# Patient Record
Sex: FEMALE | Race: WHITE | URBAN - METROPOLITAN AREA
[De-identification: names, ages, dates, MRNs, and addresses within clinical notes are randomized per-mention and may not be internally consistent; named-entity substitution may affect disease eponyms.]

---

## 2017-01-03 ENCOUNTER — INPATIENT (INPATIENT)
Facility: HOSPITAL | Age: 77
LOS: 2 days | Discharge: NURSING, REHAB OR LONG-TERM CARE | DRG: 291 | End: 2017-01-06
Attending: INTERNAL MEDICINE | Admitting: INTERNAL MEDICINE
Payer: MEDICARE

## 2017-01-03 VITALS
RESPIRATION RATE: 16 BRPM | HEART RATE: 73 BPM | SYSTOLIC BLOOD PRESSURE: 120 MMHG | DIASTOLIC BLOOD PRESSURE: 56 MMHG | OXYGEN SATURATION: 100 %

## 2017-01-03 DIAGNOSIS — J44.1 CHRONIC OBSTRUCTIVE PULMONARY DISEASE WITH (ACUTE) EXACERBATION: ICD-10-CM

## 2017-01-03 DIAGNOSIS — I25.10 ATHEROSCLEROTIC HEART DISEASE OF NATIVE CORONARY ARTERY WITHOUT ANGINA PECTORIS: ICD-10-CM

## 2017-01-03 DIAGNOSIS — E11.8 TYPE 2 DIABETES MELLITUS WITH UNSPECIFIED COMPLICATIONS: ICD-10-CM

## 2017-01-03 DIAGNOSIS — E03.9 HYPOTHYROIDISM, UNSPECIFIED: ICD-10-CM

## 2017-01-03 DIAGNOSIS — N28.9 DISORDER OF KIDNEY AND URETER, UNSPECIFIED: ICD-10-CM

## 2017-01-03 DIAGNOSIS — I48.91 UNSPECIFIED ATRIAL FIBRILLATION: ICD-10-CM

## 2017-01-03 DIAGNOSIS — Z98.49 CATARACT EXTRACTION STATUS, UNSPECIFIED EYE: Chronic | ICD-10-CM

## 2017-01-03 DIAGNOSIS — Z41.8 ENCOUNTER FOR OTHER PROCEDURES FOR PURPOSES OTHER THAN REMEDYING HEALTH STATE: ICD-10-CM

## 2017-01-03 DIAGNOSIS — I50.9 HEART FAILURE, UNSPECIFIED: ICD-10-CM

## 2017-01-03 PROCEDURE — 99223 1ST HOSP IP/OBS HIGH 75: CPT

## 2017-01-03 PROCEDURE — 93010 ELECTROCARDIOGRAM REPORT: CPT

## 2017-01-03 PROCEDURE — 71010: CPT | Mod: 26

## 2017-01-03 PROCEDURE — 99285 EMERGENCY DEPT VISIT HI MDM: CPT

## 2017-01-03 RX ORDER — FUROSEMIDE 40 MG
20 TABLET ORAL
Qty: 0 | Refills: 0 | Status: COMPLETED | OUTPATIENT
Start: 2017-01-03 | End: 2017-01-05

## 2017-01-03 RX ORDER — INSULIN GLARGINE 100 [IU]/ML
30 INJECTION, SOLUTION SUBCUTANEOUS AT BEDTIME
Qty: 0 | Refills: 0 | Status: DISCONTINUED | OUTPATIENT
Start: 2017-01-03 | End: 2017-01-06

## 2017-01-03 RX ORDER — ZOLPIDEM TARTRATE 10 MG/1
5 TABLET ORAL AT BEDTIME
Qty: 0 | Refills: 0 | Status: DISCONTINUED | OUTPATIENT
Start: 2017-01-03 | End: 2017-01-06

## 2017-01-03 RX ORDER — DEXTROSE 50 % IN WATER 50 %
25 SYRINGE (ML) INTRAVENOUS ONCE
Qty: 0 | Refills: 0 | Status: DISCONTINUED | OUTPATIENT
Start: 2017-01-03 | End: 2017-01-06

## 2017-01-03 RX ORDER — CLOPIDOGREL BISULFATE 75 MG/1
75 TABLET, FILM COATED ORAL DAILY
Qty: 0 | Refills: 0 | Status: DISCONTINUED | OUTPATIENT
Start: 2017-01-03 | End: 2017-01-06

## 2017-01-03 RX ORDER — LEVOTHYROXINE SODIUM 125 MCG
75 TABLET ORAL DAILY
Qty: 0 | Refills: 0 | Status: DISCONTINUED | OUTPATIENT
Start: 2017-01-03 | End: 2017-01-06

## 2017-01-03 RX ORDER — FLUTICASONE PROPIONATE AND SALMETEROL 50; 250 UG/1; UG/1
1 POWDER ORAL; RESPIRATORY (INHALATION)
Qty: 0 | Refills: 0 | Status: DISCONTINUED | OUTPATIENT
Start: 2017-01-03 | End: 2017-01-06

## 2017-01-03 RX ORDER — DEXTROSE 50 % IN WATER 50 %
12.5 SYRINGE (ML) INTRAVENOUS ONCE
Qty: 0 | Refills: 0 | Status: DISCONTINUED | OUTPATIENT
Start: 2017-01-03 | End: 2017-01-06

## 2017-01-03 RX ORDER — INSULIN LISPRO 100/ML
VIAL (ML) SUBCUTANEOUS
Qty: 0 | Refills: 0 | Status: DISCONTINUED | OUTPATIENT
Start: 2017-01-03 | End: 2017-01-06

## 2017-01-03 RX ORDER — ACETAMINOPHEN 500 MG
650 TABLET ORAL EVERY 6 HOURS
Qty: 0 | Refills: 0 | Status: DISCONTINUED | OUTPATIENT
Start: 2017-01-03 | End: 2017-01-06

## 2017-01-03 RX ORDER — GLUCAGON INJECTION, SOLUTION 0.5 MG/.1ML
1 INJECTION, SOLUTION SUBCUTANEOUS ONCE
Qty: 0 | Refills: 0 | Status: DISCONTINUED | OUTPATIENT
Start: 2017-01-03 | End: 2017-01-06

## 2017-01-03 RX ORDER — SIMVASTATIN 20 MG/1
20 TABLET, FILM COATED ORAL AT BEDTIME
Qty: 0 | Refills: 0 | Status: DISCONTINUED | OUTPATIENT
Start: 2017-01-03 | End: 2017-01-06

## 2017-01-03 RX ORDER — SODIUM CHLORIDE 9 MG/ML
1000 INJECTION, SOLUTION INTRAVENOUS
Qty: 0 | Refills: 0 | Status: DISCONTINUED | OUTPATIENT
Start: 2017-01-03 | End: 2017-01-06

## 2017-01-03 RX ORDER — INSULIN GLARGINE 100 [IU]/ML
40 INJECTION, SOLUTION SUBCUTANEOUS EVERY MORNING
Qty: 0 | Refills: 0 | Status: DISCONTINUED | OUTPATIENT
Start: 2017-01-03 | End: 2017-01-06

## 2017-01-03 RX ORDER — ALBUTEROL 90 UG/1
2.5 AEROSOL, METERED ORAL EVERY 4 HOURS
Qty: 0 | Refills: 0 | Status: DISCONTINUED | OUTPATIENT
Start: 2017-01-03 | End: 2017-01-06

## 2017-01-03 RX ORDER — WARFARIN SODIUM 2.5 MG/1
6 TABLET ORAL ONCE
Qty: 0 | Refills: 0 | Status: COMPLETED | OUTPATIENT
Start: 2017-01-03 | End: 2017-01-03

## 2017-01-03 RX ORDER — ASPIRIN/CALCIUM CARB/MAGNESIUM 324 MG
81 TABLET ORAL DAILY
Qty: 0 | Refills: 0 | Status: DISCONTINUED | OUTPATIENT
Start: 2017-01-03 | End: 2017-01-04

## 2017-01-03 RX ORDER — ATENOLOL 25 MG/1
25 TABLET ORAL
Qty: 0 | Refills: 0 | Status: DISCONTINUED | OUTPATIENT
Start: 2017-01-03 | End: 2017-01-06

## 2017-01-03 RX ORDER — DEXTROSE 50 % IN WATER 50 %
1 SYRINGE (ML) INTRAVENOUS ONCE
Qty: 0 | Refills: 0 | Status: DISCONTINUED | OUTPATIENT
Start: 2017-01-03 | End: 2017-01-06

## 2017-01-03 RX ORDER — INSULIN LISPRO 100/ML
VIAL (ML) SUBCUTANEOUS AT BEDTIME
Qty: 0 | Refills: 0 | Status: DISCONTINUED | OUTPATIENT
Start: 2017-01-03 | End: 2017-01-06

## 2017-01-03 RX ORDER — INSULIN GLARGINE 100 [IU]/ML
40 INJECTION, SOLUTION SUBCUTANEOUS AT BEDTIME
Qty: 0 | Refills: 0 | Status: DISCONTINUED | OUTPATIENT
Start: 2017-01-03 | End: 2017-01-03

## 2017-01-03 RX ORDER — IPRATROPIUM/ALBUTEROL SULFATE 18-103MCG
3 AEROSOL WITH ADAPTER (GRAM) INHALATION EVERY 6 HOURS
Qty: 0 | Refills: 0 | Status: DISCONTINUED | OUTPATIENT
Start: 2017-01-03 | End: 2017-01-06

## 2017-01-03 RX ADMIN — Medication 40 MILLIGRAM(S): at 18:53

## 2017-01-03 RX ADMIN — ZOLPIDEM TARTRATE 5 MILLIGRAM(S): 10 TABLET ORAL at 23:27

## 2017-01-03 RX ADMIN — Medication 3 MILLILITER(S): at 23:33

## 2017-01-03 RX ADMIN — INSULIN GLARGINE 30 UNIT(S): 100 INJECTION, SOLUTION SUBCUTANEOUS at 22:42

## 2017-01-03 RX ADMIN — Medication 3 MILLILITER(S): at 19:54

## 2017-01-03 RX ADMIN — Medication: at 17:45

## 2017-01-03 RX ADMIN — ZOLPIDEM TARTRATE 5 MILLIGRAM(S): 10 TABLET ORAL at 22:24

## 2017-01-03 RX ADMIN — WARFARIN SODIUM 6 MILLIGRAM(S): 2.5 TABLET ORAL at 22:24

## 2017-01-03 RX ADMIN — ATENOLOL 25 MILLIGRAM(S): 25 TABLET ORAL at 19:01

## 2017-01-03 RX ADMIN — FLUTICASONE PROPIONATE AND SALMETEROL 1 DOSE(S): 50; 250 POWDER ORAL; RESPIRATORY (INHALATION) at 18:54

## 2017-01-03 RX ADMIN — Medication 20 MILLIGRAM(S): at 18:53

## 2017-01-03 NOTE — H&P ADULT. - PROBLEM SELECTOR PLAN 1
Admitted to telemetry.  Will place patient on low-dose IV Lasix and PO Zaroxlyn in view of her renal insufficiency.  We'll continue patient on her beta blockers.  Monitor intake and output.  Monitor daily weights.  Monitor BMP.  Will check 2-D echo to assess LV function.  Cardiology consultation requested.  Further management per patient's clinical course.

## 2017-01-03 NOTE — INPATIENT CERTIFICATION FOR MEDICARE PATIENTS - RISKS OF ADVERSE EVENTS
Concern for delay in diagnosis and treatment/Concern for renal deterioration/Concern for cardiopulmonary deterioration

## 2017-01-03 NOTE — H&P ADULT. - RESPIRATORY COMMENTS
Bilateral decreased breath sounds noted at the bases.  Occasional rales noted at the bases.  Occasional wheezing heard scattered throughout the lungs.  No rhonchi are heard.

## 2017-01-03 NOTE — H&P ADULT. - PSH
hysterectomy due to menometorrhagia/Bleeding      right  hip replacement due to Arthropathy    b/l  eye surgery for ? Ocular Hypertension     Delivery ,  &     in 2006  left  hip replacement due to Arthropathy    S/P Cholecystectomy    S/P Tonsillectomy    Status post cataract extraction

## 2017-01-03 NOTE — H&P ADULT. - PROBLEM SELECTOR PLAN 2
Patient with possible mild COPD exacerbation.  We'll place on low-dose IV steroids and DuoNeb nebs.  Continue patient on Advair and Spiriva.  Continue O2 supplementation.  Pulmonary critical care consultation requested.  Further management per patient's clinical course

## 2017-01-03 NOTE — H&P ADULT. - PROBLEM SELECTOR PLAN 5
Will continue patient on Lantus twice a day as per her outpatient regimen.  We'll place patient on female sliding scale coverage.  Hypoglycemia protocol.  Check A1c in a.m.

## 2017-01-03 NOTE — H&P ADULT. - PROBLEM SELECTOR PLAN 4
Will continue patient on aspirin and Plavix due to her history of stent.  Continue patient on atenolol with holding parameters.  We will obtain serial CPKs and troponins.  Cardiology follow-up requested.

## 2017-01-03 NOTE — H&P ADULT. - HISTORY OF PRESENT ILLNESS
76 years old female with history of CAD, S/P Stent, COPD, home O2 dependent, HTN, DM, CHF (diastolic), morbid obesity, insomnia, and hypothyroidism, who came in to ER with c/o progressively increasing shortness of breath on ambulation and decreasing exercise capacity. Patient states the symptoms started few days ago. 76-year-old female with past medical history of CAD, stent placement, hypertension, diabetes mellitus, chronic diastolic heart failure, COPD, home O2 dependent, morbid obesity, insomnia and hypothyroidism, who was sent into the emergency room secondary to progressively worsening shortness of breath and decreasing exercise capacity.  Patient states she has been having symptoms for last few days.  She also noticed increased abdominal girth and weight.  She denies any associated chest pain or chest pressure.  Denies any nausea or vomiting.  Denies any fevers or chills.    Patient admits to having orthopnea and has not been able to lie down in the bed to sleep.  She denies any paroxysmal nocturnal dyspnea.  She denies any dizziness or syncope.  She denies any previous such episodes.  Patient is being admitted to the hospital for further evaluation and management.

## 2017-01-03 NOTE — H&P ADULT. - PMH
2006  left hip Arthropathy    Atrial Fibrillation    COPD (Chronic Obstructive Pulmonary Disease)    Diabetes x 15 years----on insulin 5 years    h/o  due menometorrhagia/Bleeding    Hypothyroid after she received radioactive iodine for hyperthyroidism    in 2004, right  hip Arthropathy and in 2006 right hip replacement    in 2011    left knee Arthropathy    Obesity    right knee Arthropathy    Sleep Apnea--uses device at night

## 2017-01-03 NOTE — H&P ADULT. - PROBLEM SELECTOR PLAN 3
Will continue patient on atenolol for rate control.  Patient is therapeutically anticoagulated on Coumadin.  Monitor daily INR and dose Coumadin according to INR.

## 2017-01-03 NOTE — H&P ADULT. - RS GEN PE MLT RESP DETAILS PC
no intercostal retractions/no chest wall tenderness/diminished breath sounds, L/no rhonchi/no subcutaneous emphysema/diminished breath sounds, R/rales/airway patent

## 2017-01-03 NOTE — H&P ADULT. - ASSESSMENT
76-year-old female with history of CAD, stent placement, hypertension, PAF, diabetes mellitus, chronic diastolic heart failure, COPD, home O2 dependent, morbid obesity, insomnia and hypothyroidism, who now presents with increasing shortness of breath.  Patient most likely has acute on chronic diastolic heart failure.  She also may have an element of COPD exacerbation.  Patient will be admitted to the hospital for further evaluation and management.

## 2017-01-04 LAB
ANION GAP SERPL CALC-SCNC: 9 MMOL/L — SIGNIFICANT CHANGE UP (ref 5–17)
BASOPHILS # BLD AUTO: 0.1 K/UL — SIGNIFICANT CHANGE UP (ref 0–0.2)
BASOPHILS NFR BLD AUTO: 0.7 % — SIGNIFICANT CHANGE UP (ref 0–2)
BUN SERPL-MCNC: 39 MG/DL — HIGH (ref 7–23)
CALCIUM SERPL-MCNC: 9.1 MG/DL — SIGNIFICANT CHANGE UP (ref 8.4–10.5)
CHLORIDE SERPL-SCNC: 101 MMOL/L — SIGNIFICANT CHANGE UP (ref 96–108)
CHOLEST SERPL-MCNC: 116 MG/DL — SIGNIFICANT CHANGE UP (ref 10–199)
CK SERPL-CCNC: 119 U/L — SIGNIFICANT CHANGE UP (ref 26–192)
CK SERPL-CCNC: 131 U/L — SIGNIFICANT CHANGE UP (ref 26–192)
CO2 SERPL-SCNC: 32 MMOL/L — HIGH (ref 22–31)
CREAT SERPL-MCNC: 1.91 MG/DL — HIGH (ref 0.5–1.3)
EOSINOPHIL # BLD AUTO: 0 K/UL — SIGNIFICANT CHANGE UP (ref 0–0.5)
EOSINOPHIL NFR BLD AUTO: 0 % — SIGNIFICANT CHANGE UP (ref 0–6)
GLUCOSE SERPL-MCNC: 380 MG/DL — HIGH (ref 70–99)
HBA1C BLD-MCNC: 8 % — HIGH (ref 4–5.6)
HCT VFR BLD CALC: 38.3 % — SIGNIFICANT CHANGE UP (ref 34.5–45)
HDLC SERPL-MCNC: 64 MG/DL — SIGNIFICANT CHANGE UP (ref 40–125)
HGB BLD-MCNC: 12.1 G/DL — SIGNIFICANT CHANGE UP (ref 11.5–15.5)
INR BLD: 2.23 RATIO — HIGH (ref 0.88–1.16)
LIPID PNL WITH DIRECT LDL SERPL: 39 MG/DL — SIGNIFICANT CHANGE UP
LYMPHOCYTES # BLD AUTO: 0.6 K/UL — LOW (ref 1–3.3)
LYMPHOCYTES # BLD AUTO: 7.5 % — LOW (ref 13–44)
MAGNESIUM SERPL-MCNC: 2 MG/DL — SIGNIFICANT CHANGE UP (ref 1.6–2.6)
MCHC RBC-ENTMCNC: 30.5 PG — SIGNIFICANT CHANGE UP (ref 27–34)
MCHC RBC-ENTMCNC: 31.5 GM/DL — LOW (ref 32–36)
MCV RBC AUTO: 97 FL — SIGNIFICANT CHANGE UP (ref 80–100)
MONOCYTES # BLD AUTO: 0.5 K/UL — SIGNIFICANT CHANGE UP (ref 0–0.9)
MONOCYTES NFR BLD AUTO: 6 % — SIGNIFICANT CHANGE UP (ref 2–14)
NEUTROPHILS # BLD AUTO: 6.4 K/UL — SIGNIFICANT CHANGE UP (ref 1.8–7.4)
NEUTROPHILS NFR BLD AUTO: 85.8 % — HIGH (ref 43–77)
PHOSPHATE SERPL-MCNC: 3.6 MG/DL — SIGNIFICANT CHANGE UP (ref 2.5–4.5)
PLATELET # BLD AUTO: 189 K/UL — SIGNIFICANT CHANGE UP (ref 150–400)
POTASSIUM SERPL-MCNC: 3.9 MMOL/L — SIGNIFICANT CHANGE UP (ref 3.5–5.3)
POTASSIUM SERPL-SCNC: 3.9 MMOL/L — SIGNIFICANT CHANGE UP (ref 3.5–5.3)
PROTHROM AB SERPL-ACNC: 25.2 SEC — HIGH (ref 10–13.1)
RBC # BLD: 3.95 M/UL — SIGNIFICANT CHANGE UP (ref 3.8–5.2)
RBC # FLD: 14.4 % — SIGNIFICANT CHANGE UP (ref 10.3–14.5)
SODIUM SERPL-SCNC: 142 MMOL/L — SIGNIFICANT CHANGE UP (ref 135–145)
T3 SERPL-MCNC: 61 NG/DL — LOW (ref 80–200)
T4 AB SER-ACNC: 7 UG/DL — SIGNIFICANT CHANGE UP (ref 4.6–12)
TOTAL CHOLESTEROL/HDL RATIO MEASUREMENT: 1.8 RATIO — LOW (ref 3.3–7.1)
TRIGL SERPL-MCNC: 67 MG/DL — SIGNIFICANT CHANGE UP (ref 10–149)
TROPONIN I SERPL-MCNC: 0 NG/ML — LOW (ref 0.02–0.06)
TROPONIN I SERPL-MCNC: 0 NG/ML — LOW (ref 0.02–0.06)
TSH SERPL-MCNC: 1.38 UU/ML — SIGNIFICANT CHANGE UP (ref 0.27–4.2)
WBC # BLD: 7.5 K/UL — SIGNIFICANT CHANGE UP (ref 3.8–10.5)
WBC # FLD AUTO: 7.5 K/UL — SIGNIFICANT CHANGE UP (ref 3.8–10.5)

## 2017-01-04 PROCEDURE — 99233 SBSQ HOSP IP/OBS HIGH 50: CPT | Mod: 25

## 2017-01-04 PROCEDURE — 93306 TTE W/DOPPLER COMPLETE: CPT | Mod: 26

## 2017-01-04 RX ORDER — BACITRACIN ZINC 500 UNIT/G
1 OINTMENT IN PACKET (EA) TOPICAL DAILY
Qty: 0 | Refills: 0 | Status: DISCONTINUED | OUTPATIENT
Start: 2017-01-04 | End: 2017-01-04

## 2017-01-04 RX ORDER — TIOTROPIUM BROMIDE 18 UG/1
1 CAPSULE ORAL; RESPIRATORY (INHALATION) DAILY
Qty: 0 | Refills: 0 | Status: DISCONTINUED | OUTPATIENT
Start: 2017-01-04 | End: 2017-01-06

## 2017-01-04 RX ORDER — BACITRACIN ZINC 500 UNIT/G
1 OINTMENT IN PACKET (EA) TOPICAL DAILY
Qty: 0 | Refills: 0 | Status: DISCONTINUED | OUTPATIENT
Start: 2017-01-04 | End: 2017-01-06

## 2017-01-04 RX ORDER — WARFARIN SODIUM 2.5 MG/1
6 TABLET ORAL ONCE
Qty: 0 | Refills: 0 | Status: COMPLETED | OUTPATIENT
Start: 2017-01-04 | End: 2017-01-04

## 2017-01-04 RX ADMIN — ATENOLOL 25 MILLIGRAM(S): 25 TABLET ORAL at 18:08

## 2017-01-04 RX ADMIN — Medication 3 MILLILITER(S): at 07:38

## 2017-01-04 RX ADMIN — CLOPIDOGREL BISULFATE 75 MILLIGRAM(S): 75 TABLET, FILM COATED ORAL at 11:51

## 2017-01-04 RX ADMIN — INSULIN GLARGINE 40 UNIT(S): 100 INJECTION, SOLUTION SUBCUTANEOUS at 08:05

## 2017-01-04 RX ADMIN — Medication 1 APPLICATION(S): at 01:13

## 2017-01-04 RX ADMIN — Medication 1 APPLICATION(S): at 11:51

## 2017-01-04 RX ADMIN — Medication 20 MILLIGRAM(S): at 18:08

## 2017-01-04 RX ADMIN — Medication 6: at 12:39

## 2017-01-04 RX ADMIN — Medication 20 MILLIGRAM(S): at 06:11

## 2017-01-04 RX ADMIN — ZOLPIDEM TARTRATE 5 MILLIGRAM(S): 10 TABLET ORAL at 21:57

## 2017-01-04 RX ADMIN — ATENOLOL 25 MILLIGRAM(S): 25 TABLET ORAL at 06:11

## 2017-01-04 RX ADMIN — Medication 40 MILLIGRAM(S): at 06:11

## 2017-01-04 RX ADMIN — ZOLPIDEM TARTRATE 5 MILLIGRAM(S): 10 TABLET ORAL at 23:44

## 2017-01-04 RX ADMIN — WARFARIN SODIUM 6 MILLIGRAM(S): 2.5 TABLET ORAL at 21:51

## 2017-01-04 RX ADMIN — INSULIN GLARGINE 30 UNIT(S): 100 INJECTION, SOLUTION SUBCUTANEOUS at 21:51

## 2017-01-04 RX ADMIN — SIMVASTATIN 20 MILLIGRAM(S): 20 TABLET, FILM COATED ORAL at 21:51

## 2017-01-04 RX ADMIN — Medication 8: at 08:05

## 2017-01-04 RX ADMIN — Medication 2: at 21:53

## 2017-01-04 RX ADMIN — Medication 8: at 16:45

## 2017-01-04 RX ADMIN — TIOTROPIUM BROMIDE 1 CAPSULE(S): 18 CAPSULE ORAL; RESPIRATORY (INHALATION) at 11:52

## 2017-01-04 RX ADMIN — Medication 3 MILLILITER(S): at 19:32

## 2017-01-04 RX ADMIN — Medication 3 MILLILITER(S): at 14:00

## 2017-01-04 RX ADMIN — Medication 75 MICROGRAM(S): at 06:11

## 2017-01-04 RX ADMIN — Medication 20 MILLIGRAM(S): at 11:51

## 2017-01-04 RX ADMIN — FLUTICASONE PROPIONATE AND SALMETEROL 1 DOSE(S): 50; 250 POWDER ORAL; RESPIRATORY (INHALATION) at 18:12

## 2017-01-04 NOTE — PROVIDER CONTACT NOTE (MEDICATION) - BACKGROUND
Patient had 1 dose of solumedrol 40 mg IVP at 6AM , Patient was seen by Dr Gonzalez and PO prednisone was ordered, requested confirmation regarding IV solumedrol

## 2017-01-04 NOTE — DIETITIAN INITIAL EVALUATION ADULT. - OTHER INFO
Pt admitted with  SOB in acute CHF. Pmhx: DM, CHF, morbid obesity, COPD, HTN, afib Pt with good appetite: No N/V or trouble chewing/swallowing. Pt states her last HgbA1C was about 8.0 (new lab pending) Reviewed carbohydrate counting in detail with patient. Reviewed healthy balanced meals and portion sizes. Also reviewed coumadin/Vit K interaction. Pt very receptive to diet education.

## 2017-01-04 NOTE — GOALS OF CARE CONVERSATION - PERSONAL ADVANCE DIRECTIVE - CONVERSATION DETAILS
spoke to pt regarding advance directives. she states that she has a HCP at home. I instructed her to have it brought to the hospital. She also states that she would not want to be resuscitated but does not want to agree to a DNR at this time. She states that she has had conversations with her children and that they know her wishes.

## 2017-01-05 ENCOUNTER — TRANSCRIPTION ENCOUNTER (OUTPATIENT)
Age: 77
End: 2017-01-05

## 2017-01-05 LAB
ALBUMIN SERPL ELPH-MCNC: 3.4 G/DL — SIGNIFICANT CHANGE UP (ref 3.3–5)
ALP SERPL-CCNC: 144 U/L — HIGH (ref 30–120)
ALT FLD-CCNC: 37 U/L DA — SIGNIFICANT CHANGE UP (ref 10–60)
ANION GAP SERPL CALC-SCNC: 9 MMOL/L — SIGNIFICANT CHANGE UP (ref 5–17)
AST SERPL-CCNC: 35 U/L — SIGNIFICANT CHANGE UP (ref 10–40)
BILIRUB SERPL-MCNC: 0.9 MG/DL — SIGNIFICANT CHANGE UP (ref 0.2–1.2)
BUN SERPL-MCNC: 45 MG/DL — HIGH (ref 7–23)
CALCIUM SERPL-MCNC: 9.5 MG/DL — SIGNIFICANT CHANGE UP (ref 8.4–10.5)
CHLORIDE SERPL-SCNC: 102 MMOL/L — SIGNIFICANT CHANGE UP (ref 96–108)
CO2 SERPL-SCNC: 30 MMOL/L — SIGNIFICANT CHANGE UP (ref 22–31)
CREAT SERPL-MCNC: 1.9 MG/DL — HIGH (ref 0.5–1.3)
GLUCOSE SERPL-MCNC: 143 MG/DL — HIGH (ref 70–99)
HCT VFR BLD CALC: 35.9 % — SIGNIFICANT CHANGE UP (ref 34.5–45)
HGB BLD-MCNC: 11.6 G/DL — SIGNIFICANT CHANGE UP (ref 11.5–15.5)
INR BLD: 2.49 RATIO — HIGH (ref 0.88–1.16)
MCHC RBC-ENTMCNC: 30.2 PG — SIGNIFICANT CHANGE UP (ref 27–34)
MCHC RBC-ENTMCNC: 32.3 GM/DL — SIGNIFICANT CHANGE UP (ref 32–36)
MCV RBC AUTO: 93.4 FL — SIGNIFICANT CHANGE UP (ref 80–100)
PLATELET # BLD AUTO: 203 K/UL — SIGNIFICANT CHANGE UP (ref 150–400)
POTASSIUM SERPL-MCNC: 3.7 MMOL/L — SIGNIFICANT CHANGE UP (ref 3.5–5.3)
POTASSIUM SERPL-SCNC: 3.7 MMOL/L — SIGNIFICANT CHANGE UP (ref 3.5–5.3)
PROT SERPL-MCNC: 7.6 G/DL — SIGNIFICANT CHANGE UP (ref 6–8.3)
PROTHROM AB SERPL-ACNC: 28.2 SEC — HIGH (ref 10–13.1)
RBC # BLD: 3.85 M/UL — SIGNIFICANT CHANGE UP (ref 3.8–5.2)
RBC # FLD: 14.1 % — SIGNIFICANT CHANGE UP (ref 10.3–14.5)
SODIUM SERPL-SCNC: 141 MMOL/L — SIGNIFICANT CHANGE UP (ref 135–145)
WBC # BLD: 11.9 K/UL — HIGH (ref 3.8–10.5)
WBC # FLD AUTO: 11.9 K/UL — HIGH (ref 3.8–10.5)

## 2017-01-05 PROCEDURE — 99233 SBSQ HOSP IP/OBS HIGH 50: CPT

## 2017-01-05 RX ORDER — FUROSEMIDE 40 MG
40 TABLET ORAL DAILY
Qty: 0 | Refills: 0 | Status: DISCONTINUED | OUTPATIENT
Start: 2017-01-06 | End: 2017-01-06

## 2017-01-05 RX ORDER — WARFARIN SODIUM 2.5 MG/1
6 TABLET ORAL ONCE
Qty: 0 | Refills: 0 | Status: COMPLETED | OUTPATIENT
Start: 2017-01-05 | End: 2017-01-05

## 2017-01-05 RX ADMIN — Medication 3 MILLILITER(S): at 19:39

## 2017-01-05 RX ADMIN — Medication 3 MILLILITER(S): at 07:38

## 2017-01-05 RX ADMIN — INSULIN GLARGINE 30 UNIT(S): 100 INJECTION, SOLUTION SUBCUTANEOUS at 22:14

## 2017-01-05 RX ADMIN — ATENOLOL 25 MILLIGRAM(S): 25 TABLET ORAL at 05:44

## 2017-01-05 RX ADMIN — Medication 3 MILLILITER(S): at 13:09

## 2017-01-05 RX ADMIN — WARFARIN SODIUM 6 MILLIGRAM(S): 2.5 TABLET ORAL at 22:13

## 2017-01-05 RX ADMIN — Medication 1 APPLICATION(S): at 12:01

## 2017-01-05 RX ADMIN — Medication 20 MILLIGRAM(S): at 17:10

## 2017-01-05 RX ADMIN — Medication 20 MILLIGRAM(S): at 05:44

## 2017-01-05 RX ADMIN — ATENOLOL 25 MILLIGRAM(S): 25 TABLET ORAL at 17:10

## 2017-01-05 RX ADMIN — TIOTROPIUM BROMIDE 1 CAPSULE(S): 18 CAPSULE ORAL; RESPIRATORY (INHALATION) at 06:35

## 2017-01-05 RX ADMIN — CLOPIDOGREL BISULFATE 75 MILLIGRAM(S): 75 TABLET, FILM COATED ORAL at 11:59

## 2017-01-05 RX ADMIN — ZOLPIDEM TARTRATE 5 MILLIGRAM(S): 10 TABLET ORAL at 22:13

## 2017-01-05 RX ADMIN — Medication 75 MICROGRAM(S): at 05:44

## 2017-01-05 RX ADMIN — Medication 6: at 17:10

## 2017-01-05 RX ADMIN — Medication 1: at 22:14

## 2017-01-05 RX ADMIN — FLUTICASONE PROPIONATE AND SALMETEROL 1 DOSE(S): 50; 250 POWDER ORAL; RESPIRATORY (INHALATION) at 19:30

## 2017-01-05 RX ADMIN — FLUTICASONE PROPIONATE AND SALMETEROL 1 DOSE(S): 50; 250 POWDER ORAL; RESPIRATORY (INHALATION) at 06:34

## 2017-01-05 RX ADMIN — Medication 4: at 13:14

## 2017-01-05 RX ADMIN — INSULIN GLARGINE 40 UNIT(S): 100 INJECTION, SOLUTION SUBCUTANEOUS at 08:25

## 2017-01-05 NOTE — DISCHARGE NOTE ADULT - COMMUNITY RESOURCES
has aide services 4H/day (10am-2pm) Mon-Wed-Fri 3 days/week thru [facility] agency being managed by PRIYA Zepeda [phone]; fax [phone]

## 2017-01-05 NOTE — DISCHARGE NOTE ADULT - CARE PLAN
Principal Discharge DX:	Acute on chronic diastolic congestive heart failure  Secondary Diagnosis:	Atrial fibrillation, unspecified type  Secondary Diagnosis:	Type 2 diabetes mellitus with complication, with long-term current use of insulin  Secondary Diagnosis:	COPD exacerbation  Secondary Diagnosis:	Renal insufficiency  Secondary Diagnosis:	Acquired hypothyroidism  Secondary Diagnosis:	Obstructive sleep apnea syndrome Principal Discharge DX:	Acute on chronic diastolic congestive heart failure  Goal:	Breath better  Instructions for follow-up, activity and diet:	Low salt, low cholesterol diabetic diet.  Watch your weight daily at the same time of the day.  Call Cardiology if you gain more than 2 lbs in one day or more than 5 lbs in 1 week.  Follow up with Dr. Palla in 1 week  Follow up with Dr. Gonzalez in 1 week.  Secondary Diagnosis:	Atrial fibrillation, unspecified type  Secondary Diagnosis:	Type 2 diabetes mellitus with complication, with long-term current use of insulin  Secondary Diagnosis:	COPD exacerbation  Secondary Diagnosis:	Renal insufficiency  Secondary Diagnosis:	Acquired hypothyroidism  Secondary Diagnosis:	Obstructive sleep apnea syndrome

## 2017-01-05 NOTE — DISCHARGE NOTE ADULT - DISCHARGE TO
SN/PT/eval for HHA services; resume [facility] aide services 4H/day , 3 days /week (Mon-Wed-Fri) ./Home with Home Care

## 2017-01-05 NOTE — DISCHARGE NOTE ADULT - CARE PROVIDER_API CALL
Palla, Venugopal R (MD), Cardiovascular Disease; Internal Medicine  [address] [place] [address]  Phone: [phone]  Fax: [phone]    Momo Gonzalez), Critical Care Medicine; Internal Medicine; Pulmonary Disease  [address] [place] [address]  Phone: [phone]  Fax: [phone]

## 2017-01-05 NOTE — DISCHARGE NOTE ADULT - PATIENT PORTAL LINK FT
“You can access the Follow[employer] Patient Portal, offered by [facility], by registering with the following website: [email or web address]”

## 2017-01-05 NOTE — DISCHARGE NOTE ADULT - HOSPITAL COURSE
76-year-old female admitted to the hospital secondary to increasing shortness of breath.  Patient was found to be in acute on chronic diastolic heart failure.  She was also having mild COPD exacerbation.  Patient was treated with IV diuretics and IV steroids with neb treatments.  Patient slowly improved.  Her steroids were changed to by mouth. Her diuretics have been changed to by mouth.      Patient's echocardiogram revealed normal ejection fraction with diastolic dysfunction.  Patient was ruled out for myocardial infarction.      She is being discharged home in a stable condition, after being cleared by cardiology and pulmonary.    Final diagnoses:  1.  Acute on chronic diastolic heart failure, present on admission, improved.  2.  COPD exacerbation, mild, present on admission, improved.  3.  Diabetes mellitus, on long-term insulin, uncontrolled.  4.  Morbid obesity with obstructive sleep apnea, on CPAP at night.  5.  Hypothyroidism  6.  Chronic atrial fibrillation, anticoagulated on Coumadin.  7.  Dyslipidemia, controlled on medication.  8.  Hypertension.  9. CKD III, POA

## 2017-01-05 NOTE — DISCHARGE NOTE ADULT - PLAN OF CARE
Breath better Low salt, low cholesterol diabetic diet.  Watch your weight daily at the same time of the day.  Call Cardiology if you gain more than 2 lbs in one day or more than 5 lbs in 1 week.  Follow up with Dr. Palla in 1 week  Follow up with Dr. Gonzalez in 1 week.

## 2017-01-05 NOTE — DISCHARGE NOTE ADULT - MEDICATION SUMMARY - MEDICATIONS TO CHANGE
I will SWITCH the dose or number of times a day I take the medications listed below when I get home from the hospital:    Lasix 40 mg oral tablet  -- 1 tab(s) by mouth every other day    furosemide 80 mg oral tablet  -- 1 tab(s) by mouth every other day    metOLazone 2.5 mg oral tablet  -- 1 tab(s) by mouth once a day

## 2017-01-05 NOTE — DISCHARGE NOTE ADULT - MEDICATION SUMMARY - MEDICATIONS TO STOP TAKING
I will STOP taking the medications listed below when I get home from the hospital:    acetaminophen-oxyCODONE 325 mg-5 mg oral tablet  -- 1 tab(s) by mouth every 6 hours, As Needed  -- Caution federal law prohibits the transfer of this drug to any person other  than the person for whom it was prescribed.  May cause drowsiness.  Alcohol may intensify this effect.  Use care when operating dangerous machinery.  This prescription cannot be refilled.  This product contains acetaminophen.  Do not use  with any other product containing acetaminophen to prevent possible liver damage.  Using more of this medication than prescribed may cause serious breathing problems.    Valium 5 mg oral tablet  -- 1 tab(s) by mouth 3 times a day, As Needed  -- Caution federal law prohibits the transfer of this drug to any person other  than the person for whom it was prescribed.  Do not take this drug if you are pregnant.  May cause drowsiness.  Alcohol may intensify this effect.  Use care when operating dangerous machinery.    Percocet 5/325  -- 1 tab(s) po every 6 hours- for moderate pain

## 2017-01-05 NOTE — DISCHARGE NOTE ADULT - MEDICATION SUMMARY - MEDICATIONS TO TAKE
I will START or STAY ON the medications listed below when I get home from the hospital:    acetaminophen 325 mg oral tablet  -- 2 tab(s) by mouth every 6 hours, As needed, Mild Pain (1 - 3)  -- Indication: For Pain    warfarin 6 mg oral tablet  -- 1 tab(s) by mouth once a day  -- Indication: For Atrial fibrillation, unspecified type    Lantus 100 units/mL subcutaneous solution  -- 40 unit(s) subcutaneous 2 times a day  -- Indication: For DM    NovoLOG 100 units/mL subcutaneous solution  --  subcutaneous sliding scale  -- Indication: For DM    metFORMIN 500 mg oral tablet  -- 1 tab(s) by mouth 2 times a day   -- Indication: For DM    simvastatin 20 mg oral tablet  -- 1 tab(s) by mouth once a day (at bedtime)  -- Indication: For Hyperlipidemia    clopidogrel 75 mg oral tablet  -- 1 tab(s) by mouth once a day  -- Indication: For CAD (coronary artery disease)    Ambien 10 mg oral tablet  -- 1 tab(s) by mouth once a day (at bedtime)  -- Indication: For Insomnia    atenolol 25 mg oral tablet  -- 1 tab(s) by mouth 2 times a day  -- Indication: For CAD (coronary artery disease)    Dulera 5 mcg-100 mcg/inh inhalation aerosol  -- 2 puff(s) inhaled once a day  -- Indication: For COPD exacerbation    bacitracin 500 units/g topical ointment  -- 1 application on skin once a day  -- Indication: For Skin tear    metOLazone 2.5 mg oral tablet  -- 1 tab(s) by mouth once a day  -- for 2 days on sat and Sunday then every other day. Take 30 mins before Lasix.   -- Indication: For Acute on chronic congestive heart failure, unspecified congestive heart failure type    Lasix 40 mg oral tablet  -- 1 tab(s) by mouth once a day    Pt states she is instructed to take 60mg or 80mg instead, depending on her weight.  -- Indication: For Acute on chronic congestive heart failure, unspecified congestive heart failure type    levothyroxine 75 mcg (0.075 mg) oral capsule  -- 1 cap(s) by mouth once a day  -- Indication: For Acquired hypothyroidism

## 2017-01-05 NOTE — DISCHARGE NOTE ADULT - CARE PROVIDERS DIRECT ADDRESSES
[email or web address]

## 2017-01-05 NOTE — DISCHARGE NOTE ADULT - NS AS ACTIVITY OBS
Walking-Outdoors allowed/No Heavy lifting/straining/Walking-Indoors allowed/Stairs allowed/Driving allowed/Showering allowed/Bathing allowed/Do not make important decisions

## 2017-01-05 NOTE — DISCHARGE NOTE ADULT - SECONDARY DIAGNOSIS.
Atrial fibrillation, unspecified type Type 2 diabetes mellitus with complication, with long-term current use of insulin COPD exacerbation Renal insufficiency Acquired hypothyroidism Obstructive sleep apnea syndrome

## 2017-01-05 NOTE — PHYSICAL THERAPY INITIAL EVALUATION ADULT - ADDITIONAL COMMENTS
Pt lives alone in a house with 3 YOLANDE, no HRs. Pt lives on the main floor. Pt reports having an aide visit 3 x/week to help with household duties. Pt owns a rolling walker and a cane that she occasionally uses during bad days.

## 2017-01-05 NOTE — PHYSICAL THERAPY INITIAL EVALUATION ADULT - PERTINENT HX OF CURRENT PROBLEM, REHAB EVAL
Pt admitted due to complaints of SOB, weakness, increased abd girth and weight. Dx; heart failure. Pt's PMH includes: COPD, A-fib, DM and obesity.

## 2017-01-06 VITALS
OXYGEN SATURATION: 98 % | TEMPERATURE: 98 F | HEART RATE: 85 BPM | DIASTOLIC BLOOD PRESSURE: 73 MMHG | RESPIRATION RATE: 18 BRPM | SYSTOLIC BLOOD PRESSURE: 123 MMHG

## 2017-01-06 LAB
ALBUMIN SERPL ELPH-MCNC: 3.4 G/DL — SIGNIFICANT CHANGE UP (ref 3.3–5)
ALP SERPL-CCNC: 128 U/L — HIGH (ref 30–120)
ALT FLD-CCNC: 37 U/L DA — SIGNIFICANT CHANGE UP (ref 10–60)
ANION GAP SERPL CALC-SCNC: 7 MMOL/L — SIGNIFICANT CHANGE UP (ref 5–17)
AST SERPL-CCNC: 39 U/L — SIGNIFICANT CHANGE UP (ref 10–40)
BILIRUB SERPL-MCNC: 0.8 MG/DL — SIGNIFICANT CHANGE UP (ref 0.2–1.2)
BUN SERPL-MCNC: 50 MG/DL — HIGH (ref 7–23)
CALCIUM SERPL-MCNC: 9.2 MG/DL — SIGNIFICANT CHANGE UP (ref 8.4–10.5)
CHLORIDE SERPL-SCNC: 101 MMOL/L — SIGNIFICANT CHANGE UP (ref 96–108)
CO2 SERPL-SCNC: 33 MMOL/L — HIGH (ref 22–31)
CREAT SERPL-MCNC: 1.94 MG/DL — HIGH (ref 0.5–1.3)
GLUCOSE SERPL-MCNC: 103 MG/DL — HIGH (ref 70–99)
HCT VFR BLD CALC: 39.4 % — SIGNIFICANT CHANGE UP (ref 34.5–45)
HGB BLD-MCNC: 12.1 G/DL — SIGNIFICANT CHANGE UP (ref 11.5–15.5)
MCHC RBC-ENTMCNC: 29.5 PG — SIGNIFICANT CHANGE UP (ref 27–34)
MCHC RBC-ENTMCNC: 30.6 GM/DL — LOW (ref 32–36)
MCV RBC AUTO: 96.4 FL — SIGNIFICANT CHANGE UP (ref 80–100)
PLATELET # BLD AUTO: 214 K/UL — SIGNIFICANT CHANGE UP (ref 150–400)
POTASSIUM SERPL-MCNC: 3.5 MMOL/L — SIGNIFICANT CHANGE UP (ref 3.5–5.3)
POTASSIUM SERPL-SCNC: 3.5 MMOL/L — SIGNIFICANT CHANGE UP (ref 3.5–5.3)
PROT SERPL-MCNC: 7.5 G/DL — SIGNIFICANT CHANGE UP (ref 6–8.3)
RBC # BLD: 4.09 M/UL — SIGNIFICANT CHANGE UP (ref 3.8–5.2)
RBC # FLD: 14.7 % — HIGH (ref 10.3–14.5)
SODIUM SERPL-SCNC: 141 MMOL/L — SIGNIFICANT CHANGE UP (ref 135–145)
WBC # BLD: 10.6 K/UL — HIGH (ref 3.8–10.5)
WBC # FLD AUTO: 10.6 K/UL — HIGH (ref 3.8–10.5)

## 2017-01-06 PROCEDURE — 83735 ASSAY OF MAGNESIUM: CPT

## 2017-01-06 PROCEDURE — 84480 ASSAY TRIIODOTHYRONINE (T3): CPT

## 2017-01-06 PROCEDURE — 93306 TTE W/DOPPLER COMPLETE: CPT

## 2017-01-06 PROCEDURE — 84484 ASSAY OF TROPONIN QUANT: CPT

## 2017-01-06 PROCEDURE — 83036 HEMOGLOBIN GLYCOSYLATED A1C: CPT

## 2017-01-06 PROCEDURE — 85730 THROMBOPLASTIN TIME PARTIAL: CPT

## 2017-01-06 PROCEDURE — 85027 COMPLETE CBC AUTOMATED: CPT

## 2017-01-06 PROCEDURE — 80048 BASIC METABOLIC PNL TOTAL CA: CPT

## 2017-01-06 PROCEDURE — 84436 ASSAY OF TOTAL THYROXINE: CPT

## 2017-01-06 PROCEDURE — 83880 ASSAY OF NATRIURETIC PEPTIDE: CPT

## 2017-01-06 PROCEDURE — 82553 CREATINE MB FRACTION: CPT

## 2017-01-06 PROCEDURE — 94640 AIRWAY INHALATION TREATMENT: CPT

## 2017-01-06 PROCEDURE — 80061 LIPID PANEL: CPT

## 2017-01-06 PROCEDURE — 93005 ELECTROCARDIOGRAM TRACING: CPT

## 2017-01-06 PROCEDURE — 84443 ASSAY THYROID STIM HORMONE: CPT

## 2017-01-06 PROCEDURE — 99232 SBSQ HOSP IP/OBS MODERATE 35: CPT

## 2017-01-06 PROCEDURE — 81001 URINALYSIS AUTO W/SCOPE: CPT

## 2017-01-06 PROCEDURE — 82550 ASSAY OF CK (CPK): CPT

## 2017-01-06 PROCEDURE — 80053 COMPREHEN METABOLIC PANEL: CPT

## 2017-01-06 PROCEDURE — 84100 ASSAY OF PHOSPHORUS: CPT

## 2017-01-06 PROCEDURE — 85610 PROTHROMBIN TIME: CPT

## 2017-01-06 PROCEDURE — 99285 EMERGENCY DEPT VISIT HI MDM: CPT

## 2017-01-06 PROCEDURE — 71045 X-RAY EXAM CHEST 1 VIEW: CPT

## 2017-01-06 PROCEDURE — 94660 CPAP INITIATION&MGMT: CPT

## 2017-01-06 RX ORDER — ACETAMINOPHEN 500 MG
2 TABLET ORAL
Qty: 0 | Refills: 0 | DISCHARGE
Start: 2017-01-06

## 2017-01-06 RX ORDER — BACITRACIN ZINC 500 UNIT/G
1 OINTMENT IN PACKET (EA) TOPICAL
Qty: 1 | Refills: 0 | OUTPATIENT
Start: 2017-01-06 | End: 2017-02-05

## 2017-01-06 RX ORDER — SIMVASTATIN 20 MG/1
1 TABLET, FILM COATED ORAL
Qty: 0 | Refills: 0 | COMMUNITY
Start: 2017-01-06

## 2017-01-06 RX ADMIN — Medication 3 MILLILITER(S): at 07:42

## 2017-01-06 RX ADMIN — FLUTICASONE PROPIONATE AND SALMETEROL 1 DOSE(S): 50; 250 POWDER ORAL; RESPIRATORY (INHALATION) at 07:04

## 2017-01-06 RX ADMIN — ATENOLOL 25 MILLIGRAM(S): 25 TABLET ORAL at 06:26

## 2017-01-06 RX ADMIN — Medication 2: at 12:50

## 2017-01-06 RX ADMIN — Medication 40 MILLIGRAM(S): at 07:04

## 2017-01-06 RX ADMIN — Medication 75 MICROGRAM(S): at 06:26

## 2017-01-06 RX ADMIN — Medication 20 MILLIGRAM(S): at 06:26

## 2017-01-06 RX ADMIN — INSULIN GLARGINE 40 UNIT(S): 100 INJECTION, SOLUTION SUBCUTANEOUS at 08:15

## 2017-01-06 RX ADMIN — TIOTROPIUM BROMIDE 1 CAPSULE(S): 18 CAPSULE ORAL; RESPIRATORY (INHALATION) at 07:04

## 2017-01-06 RX ADMIN — Medication 3 MILLILITER(S): at 13:59

## 2017-01-06 RX ADMIN — Medication 1 APPLICATION(S): at 11:36

## 2017-01-06 RX ADMIN — CLOPIDOGREL BISULFATE 75 MILLIGRAM(S): 75 TABLET, FILM COATED ORAL at 11:36

## 2017-01-13 ENCOUNTER — LABORATORY RESULT (OUTPATIENT)
Age: 77
End: 2017-01-13

## 2017-01-13 ENCOUNTER — RESULT REVIEW (OUTPATIENT)
Age: 77
End: 2017-01-13

## 2017-01-16 ENCOUNTER — MEDICATION RENEWAL (OUTPATIENT)
Age: 77
End: 2017-01-16

## 2017-01-20 ENCOUNTER — LABORATORY RESULT (OUTPATIENT)
Age: 77
End: 2017-01-20

## 2017-01-20 ENCOUNTER — MEDICATION RENEWAL (OUTPATIENT)
Age: 77
End: 2017-01-20

## 2017-01-23 ENCOUNTER — RESULT REVIEW (OUTPATIENT)
Age: 77
End: 2017-01-23

## 2017-01-24 ENCOUNTER — MED ADMIN CHARGE (OUTPATIENT)
Age: 77
End: 2017-01-24

## 2017-01-24 DIAGNOSIS — E55.9 VITAMIN D DEFICIENCY, UNSPECIFIED: ICD-10-CM

## 2017-01-24 LAB
ANION GAP SERPL CALC-SCNC: 15 MMOL/L
BUN SERPL-MCNC: 39 MG/DL
CALCIUM SERPL-MCNC: 9.2 MG/DL
CHLORIDE SERPL-SCNC: 96 MMOL/L
CO2 SERPL-SCNC: 28 MMOL/L
CREAT SERPL-MCNC: 1.61 MG/DL
GLUCOSE SERPL-MCNC: 170 MG/DL
POTASSIUM SERPL-SCNC: 4 MMOL/L
SODIUM SERPL-SCNC: 139 MMOL/L

## 2017-01-24 RX ORDER — ERGOCALCIFEROL 1.25 MG/1
1.25 MG CAPSULE, LIQUID FILLED ORAL
Qty: 8 | Refills: 1 | Status: ACTIVE | COMMUNITY
Start: 2017-01-24 | End: 1900-01-01

## 2017-07-06 ENCOUNTER — EMERGENCY (EMERGENCY)
Facility: HOSPITAL | Age: 77
LOS: 1 days | Discharge: HOME | End: 2017-07-06
Admitting: EMERGENCY MEDICINE
Payer: MEDICARE

## 2017-07-06 DIAGNOSIS — Z98.49 CATARACT EXTRACTION STATUS, UNSPECIFIED EYE: Chronic | ICD-10-CM

## 2017-07-06 DIAGNOSIS — Z99.81 DEPENDENCE ON SUPPLEMENTAL OXYGEN: ICD-10-CM

## 2017-07-06 DIAGNOSIS — S20.229A CONTUSION OF UNSPECIFIED BACK WALL OF THORAX, INITIAL ENCOUNTER: ICD-10-CM

## 2017-07-06 DIAGNOSIS — Z79.01 LONG TERM (CURRENT) USE OF ANTICOAGULANTS: ICD-10-CM

## 2017-07-06 DIAGNOSIS — S20.219A CONTUSION OF UNSPECIFIED FRONT WALL OF THORAX, INITIAL ENCOUNTER: ICD-10-CM

## 2017-07-06 DIAGNOSIS — S09.90XA UNSPECIFIED INJURY OF HEAD, INITIAL ENCOUNTER: ICD-10-CM
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
## 2017-07-06 DIAGNOSIS — I50.9 HEART FAILURE, UNSPECIFIED: ICD-10-CM

## 2017-07-06 DIAGNOSIS — Z91.041 RADIOGRAPHIC DYE ALLERGY STATUS: ICD-10-CM

## 2017-07-06 DIAGNOSIS — S00.03XA CONTUSION OF SCALP, INITIAL ENCOUNTER: ICD-10-CM

## 2017-07-06 DIAGNOSIS — Z88.0 ALLERGY STATUS TO PENICILLIN: ICD-10-CM

## 2017-07-06 DIAGNOSIS — J44.9 CHRONIC OBSTRUCTIVE PULMONARY DISEASE, UNSPECIFIED: ICD-10-CM
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
## 2017-07-06 PROCEDURE — 99284 EMERGENCY DEPT VISIT MOD MDM: CPT

## 2017-07-06 PROCEDURE — 71250 CT THORAX DX C-: CPT

## 2017-07-06 PROCEDURE — 85610 PROTHROMBIN TIME: CPT

## 2017-07-06 PROCEDURE — 85730 THROMBOPLASTIN TIME PARTIAL: CPT

## 2017-07-06 PROCEDURE — 70450 CT HEAD/BRAIN W/O DYE: CPT | Mod: 26

## 2017-07-06 PROCEDURE — 71250 CT THORAX DX C-: CPT | Mod: 26

## 2017-07-06 PROCEDURE — 70450 CT HEAD/BRAIN W/O DYE: CPT

## 2017-07-06 PROCEDURE — 99284 EMERGENCY DEPT VISIT MOD MDM: CPT | Mod: 25

## 2018-07-18 VITALS
DIASTOLIC BLOOD PRESSURE: 58 MMHG | SYSTOLIC BLOOD PRESSURE: 100 MMHG | BODY MASS INDEX: 36.02 KG/M2 | WEIGHT: 229.99 LBS

## 2018-08-08 ENCOUNTER — EMERGENCY (EMERGENCY)
Facility: HOSPITAL | Age: 78
LOS: 1 days | Discharge: HOME | End: 2018-08-08
Attending: EMERGENCY MEDICINE
Payer: MEDICARE

## 2018-08-08 VITALS
DIASTOLIC BLOOD PRESSURE: 52 MMHG | OXYGEN SATURATION: 99 % | HEART RATE: 70 BPM | SYSTOLIC BLOOD PRESSURE: 120 MMHG | RESPIRATION RATE: 17 BRPM

## 2018-08-08 VITALS
TEMPERATURE: 98 F | OXYGEN SATURATION: 98 % | HEART RATE: 76 BPM | DIASTOLIC BLOOD PRESSURE: 49 MMHG | SYSTOLIC BLOOD PRESSURE: 116 MMHG | WEIGHT: 225.09 LBS | RESPIRATION RATE: 16 BRPM

## 2018-08-08 DIAGNOSIS — Z98.49 CATARACT EXTRACTION STATUS, UNSPECIFIED EYE: Chronic | ICD-10-CM

## 2018-08-08 LAB
ANION GAP SERPL CALC-SCNC: 9 MMOL/L — SIGNIFICANT CHANGE UP (ref 5–17)
APTT BLD: 48.1 SEC — HIGH (ref 27.5–37.4)
BASOPHILS # BLD AUTO: 0.06 K/UL — SIGNIFICANT CHANGE UP (ref 0–0.2)
BASOPHILS NFR BLD AUTO: 0.7 % — SIGNIFICANT CHANGE UP (ref 0–2)
BUN SERPL-MCNC: 55 MG/DL — HIGH (ref 7–23)
CALCIUM SERPL-MCNC: 9 MG/DL — SIGNIFICANT CHANGE UP (ref 8.5–10.1)
CHLORIDE SERPL-SCNC: 101 MMOL/L — SIGNIFICANT CHANGE UP (ref 96–108)
CO2 SERPL-SCNC: 28 MMOL/L — SIGNIFICANT CHANGE UP (ref 22–31)
CREAT SERPL-MCNC: 1.9 MG/DL — HIGH (ref 0.5–1.3)
EOSINOPHIL # BLD AUTO: 0.14 K/UL — SIGNIFICANT CHANGE UP (ref 0–0.5)
EOSINOPHIL NFR BLD AUTO: 1.5 % — SIGNIFICANT CHANGE UP (ref 0–6)
GLUCOSE SERPL-MCNC: 159 MG/DL — HIGH (ref 70–99)
HCT VFR BLD CALC: 42.6 % — SIGNIFICANT CHANGE UP (ref 34.5–45)
HGB BLD-MCNC: 14.5 G/DL — SIGNIFICANT CHANGE UP (ref 11.5–15.5)
IMM GRANULOCYTES NFR BLD AUTO: 0.3 % — SIGNIFICANT CHANGE UP (ref 0–1.5)
INR BLD: 3.3 RATIO — HIGH (ref 0.88–1.16)
LYMPHOCYTES # BLD AUTO: 1.06 K/UL — SIGNIFICANT CHANGE UP (ref 1–3.3)
LYMPHOCYTES # BLD AUTO: 11.6 % — LOW (ref 13–44)
MCHC RBC-ENTMCNC: 32.1 PG — SIGNIFICANT CHANGE UP (ref 27–34)
MCHC RBC-ENTMCNC: 34 GM/DL — SIGNIFICANT CHANGE UP (ref 32–36)
MCV RBC AUTO: 94.2 FL — SIGNIFICANT CHANGE UP (ref 80–100)
MONOCYTES # BLD AUTO: 1.24 K/UL — HIGH (ref 0–0.9)
MONOCYTES NFR BLD AUTO: 13.6 % — SIGNIFICANT CHANGE UP (ref 2–14)
NEUTROPHILS # BLD AUTO: 6.59 K/UL — SIGNIFICANT CHANGE UP (ref 1.8–7.4)
NEUTROPHILS NFR BLD AUTO: 72.3 % — SIGNIFICANT CHANGE UP (ref 43–77)
PLATELET # BLD AUTO: 274 K/UL — SIGNIFICANT CHANGE UP (ref 150–400)
POTASSIUM SERPL-MCNC: 4 MMOL/L — SIGNIFICANT CHANGE UP (ref 3.5–5.3)
POTASSIUM SERPL-SCNC: 4 MMOL/L — SIGNIFICANT CHANGE UP (ref 3.5–5.3)
PROTHROM AB SERPL-ACNC: 36.9 SEC — HIGH (ref 9.8–12.7)
RBC # BLD: 4.52 M/UL — SIGNIFICANT CHANGE UP (ref 3.8–5.2)
RBC # FLD: 13.8 % — SIGNIFICANT CHANGE UP (ref 10.3–14.5)
SODIUM SERPL-SCNC: 138 MMOL/L — SIGNIFICANT CHANGE UP (ref 135–145)
URATE SERPL-MCNC: 8.8 MG/DL — HIGH (ref 2.5–7)
WBC # BLD: 9.12 K/UL — SIGNIFICANT CHANGE UP (ref 3.8–10.5)
WBC # FLD AUTO: 9.12 K/UL — SIGNIFICANT CHANGE UP (ref 3.8–10.5)

## 2018-08-08 PROCEDURE — 80048 BASIC METABOLIC PNL TOTAL CA: CPT

## 2018-08-08 PROCEDURE — 99284 EMERGENCY DEPT VISIT MOD MDM: CPT | Mod: 25

## 2018-08-08 PROCEDURE — 84550 ASSAY OF BLOOD/URIC ACID: CPT

## 2018-08-08 PROCEDURE — 73630 X-RAY EXAM OF FOOT: CPT | Mod: 26,RT

## 2018-08-08 PROCEDURE — 85730 THROMBOPLASTIN TIME PARTIAL: CPT

## 2018-08-08 PROCEDURE — 73610 X-RAY EXAM OF ANKLE: CPT | Mod: 26,RT

## 2018-08-08 PROCEDURE — 73630 X-RAY EXAM OF FOOT: CPT

## 2018-08-08 PROCEDURE — 85610 PROTHROMBIN TIME: CPT

## 2018-08-08 PROCEDURE — 73610 X-RAY EXAM OF ANKLE: CPT

## 2018-08-08 PROCEDURE — 99285 EMERGENCY DEPT VISIT HI MDM: CPT

## 2018-08-08 PROCEDURE — 93971 EXTREMITY STUDY: CPT

## 2018-08-08 PROCEDURE — 85027 COMPLETE CBC AUTOMATED: CPT

## 2018-08-08 PROCEDURE — 93971 EXTREMITY STUDY: CPT | Mod: 26,RT

## 2018-08-08 RX ORDER — FUROSEMIDE 40 MG
1 TABLET ORAL
Qty: 0 | Refills: 0 | COMMUNITY

## 2018-08-08 RX ORDER — OXYCODONE AND ACETAMINOPHEN 5; 325 MG/1; MG/1
1 TABLET ORAL ONCE
Qty: 0 | Refills: 0 | Status: DISCONTINUED | OUTPATIENT
Start: 2018-08-08 | End: 2018-08-08

## 2018-08-08 RX ADMIN — OXYCODONE AND ACETAMINOPHEN 1 TABLET(S): 5; 325 TABLET ORAL at 09:22

## 2018-08-08 NOTE — ED PROVIDER NOTE - NS ED ROS FT
GEN: no fever, no chills, no weakness  HENT: no eye pain, no visual changes, no ear pain, no visual or hearing changes, no sore throat, no swelling or neck pain  CV: no chest pain, no palpitations, no dizziness, no swelling  RESP: no coughing, no sob, no IWOB, no LOPEZ  GI: no abd pain, no distension, no nausea, no vomiting, no diarrhea, no constipation  : no dysuria,  no frequency, no hematuria, no discharge, no flank pain  MUSCULOSKELETAL: no myalgia, +arthralgia, no joint swelling, no bruising   SKIN: no rash, no wounds, no itching  NEURO: no change in mentation, no visual changes, no HA, no focal weakness, no trouble speaking, no gait abnormalities, no dizziness  PSYCH: no suidical ideation, no homicidal ideation, no depression, no anxiety, no hallucinations

## 2018-08-08 NOTE — ED PROVIDER NOTE - OBJECTIVE STATEMENT
79yo F h/o COPD, afib on coumadin, chf, ckd hld p/w R foot pain since last night. denies f/c/n/v/d/cp/sob/trauma/paresthesia.

## 2018-08-08 NOTE — ED PROVIDER NOTE - MEDICAL DECISION MAKING DETAILS
77yo F h/o COPD, afib on coumadin, chf, ckd hld p/w R foot pain since last night. denies f/c/n/v/d/cp/sob/trauma/paresthesia.

## 2018-08-08 NOTE — ED PROVIDER NOTE - PROGRESS NOTE DETAILS
XR sono, unremarkable, labs sig for mild elevated uric acid, findings discussed w/ pt, given renal dysfunction dm not a candidate for nsaid, steroids or colchicine, will prescribe percocet for pain control, pt to f/u w/ pcp for further eval and mgmt

## 2018-08-08 NOTE — ED ADULT NURSE NOTE - NSIMPLEMENTINTERV_GEN_ALL_ED
Implemented All Universal Safety Interventions:  [place] to call system. Call bell, personal items and telephone within reach. Instruct patient to call for assistance. Room bathroom lighting operational. Non-slip footwear when patient is off stretcher. Physically safe environment: no spills, clutter or unnecessary equipment. Stretcher in lowest position, wheels locked, appropriate side rails in place.

## 2018-08-08 NOTE — ED ADULT NURSE NOTE - OBJECTIVE STATEMENT
Pt received in bed alert and oriented and resting in bed with c/o right ankle and foot pain. As per Md's orders IV margo laced blood specimen obtained and sent to the lab. Nursing care ongoing and safety maintained.

## 2018-08-08 NOTE — ED PROVIDER NOTE - PHYSICAL EXAMINATION
GEN: awake, alert, well appearing, NAD   HENT: atraumatic, normocephalic, KAREN, EOMI, no midline instability, oropharynx w/o erythema or exudates, no lymphadenopathy  CV: normal rate and rhythm, S1, S2, no MRG, equal pulses throughout, no JVD  RESP: no distress, no IWOB, no retraction, clear to auscultation bilaterally   ABD: soft, nontender, nondistended, no rebound, no guarding, normoactive bowel sounds, no organomegally  MUSCULOSKELETAL: strenght 5/5 x 4, full range of motion, CMS intact, mild ttp to R fore foot, no hematoma or e/o trauma   SKIN: normal color, no turgor, no wounds or rash   NEURO: Awake alert oriented x 3, no facial asymmetry, no slurred speech, no pronator drift, moving all extremities  PSYCH: no suicial ideation, no homicidal ideation, no depression, no anxiety, no hallucination GEN: awake, alert, well appearing, NAD   HENT: atraumatic, normocephalic, KAREN, EOMI, no midline instability, oropharynx w/o erythema or exudates, no lymphadenopathy  CV: normal rate and rhythm, S1, S2, no MRG, equal pulses throughout, no JVD  RESP: no distress, no IWOB, no retraction, clear to auscultation bilaterally   ABD: soft, nontender, nondistended, no rebound, no guarding, normoactive bowel sounds, no organomegally  MUSCULOSKELETAL: strenght 5/5 x 4, full range of motion, CMS intact, mild ttp to R fore foot, no hematoma or e/o trauma, bedside doppler w/ good DP and posterior tibial pulse   SKIN: normal color, no turgor, no wounds or rash   NEURO: Awake alert oriented x 3, no facial asymmetry, no slurred speech, no pronator drift, moving all extremities  PSYCH: no suicial ideation, no homicidal ideation, no depression, no anxiety, no hallucination

## 2019-06-11 ENCOUNTER — EMERGENCY (EMERGENCY)
Facility: HOSPITAL | Age: 79
LOS: 1 days | Discharge: HOME | End: 2019-06-11
Attending: EMERGENCY MEDICINE | Admitting: EMERGENCY MEDICINE
Payer: MEDICARE

## 2019-06-11 VITALS
WEIGHT: 233.91 LBS | HEIGHT: 67 IN | HEART RATE: 90 BPM | RESPIRATION RATE: 15 BRPM | DIASTOLIC BLOOD PRESSURE: 65 MMHG | TEMPERATURE: 98 F | OXYGEN SATURATION: 94 % | SYSTOLIC BLOOD PRESSURE: 121 MMHG

## 2019-06-11 VITALS
RESPIRATION RATE: 13 BRPM | OXYGEN SATURATION: 95 % | SYSTOLIC BLOOD PRESSURE: 117 MMHG | DIASTOLIC BLOOD PRESSURE: 60 MMHG | TEMPERATURE: 98 F | HEART RATE: 76 BPM

## 2019-06-11 DIAGNOSIS — Z98.49 CATARACT EXTRACTION STATUS, UNSPECIFIED EYE: Chronic | ICD-10-CM

## 2019-06-11 LAB
ALBUMIN SERPL ELPH-MCNC: 3.3 G/DL — SIGNIFICANT CHANGE UP (ref 3.3–5)
ALP SERPL-CCNC: 151 U/L — HIGH (ref 40–120)
ALT FLD-CCNC: 44 U/L — SIGNIFICANT CHANGE UP (ref 12–78)
ANION GAP SERPL CALC-SCNC: 8 MMOL/L — SIGNIFICANT CHANGE UP (ref 5–17)
APTT BLD: 43.3 SEC — HIGH (ref 27.5–36.3)
AST SERPL-CCNC: 56 U/L — HIGH (ref 15–37)
BASOPHILS # BLD AUTO: 0.11 K/UL — SIGNIFICANT CHANGE UP (ref 0–0.2)
BASOPHILS NFR BLD AUTO: 1 % — SIGNIFICANT CHANGE UP (ref 0–2)
BILIRUB SERPL-MCNC: 1.9 MG/DL — HIGH (ref 0.2–1.2)
BUN SERPL-MCNC: 46 MG/DL — HIGH (ref 7–23)
CALCIUM SERPL-MCNC: 8.6 MG/DL — SIGNIFICANT CHANGE UP (ref 8.5–10.1)
CHLORIDE SERPL-SCNC: 104 MMOL/L — SIGNIFICANT CHANGE UP (ref 96–108)
CO2 SERPL-SCNC: 26 MMOL/L — SIGNIFICANT CHANGE UP (ref 22–31)
CREAT SERPL-MCNC: 2.1 MG/DL — HIGH (ref 0.5–1.3)
EOSINOPHIL # BLD AUTO: 0.11 K/UL — SIGNIFICANT CHANGE UP (ref 0–0.5)
EOSINOPHIL NFR BLD AUTO: 1 % — SIGNIFICANT CHANGE UP (ref 0–6)
ERYTHROCYTE [SEDIMENTATION RATE] IN BLOOD: 44 MM/HR — HIGH (ref 0–20)
GLUCOSE SERPL-MCNC: 193 MG/DL — HIGH (ref 70–99)
HCT VFR BLD CALC: 45.7 % — HIGH (ref 34.5–45)
HGB BLD-MCNC: 15 G/DL — SIGNIFICANT CHANGE UP (ref 11.5–15.5)
INR BLD: 3.37 RATIO — HIGH (ref 0.88–1.16)
LACTATE SERPL-SCNC: 1.8 MMOL/L — SIGNIFICANT CHANGE UP (ref 0.7–2)
LYMPHOCYTES # BLD AUTO: 2.57 K/UL — SIGNIFICANT CHANGE UP (ref 1–3.3)
LYMPHOCYTES # BLD AUTO: 24 % — SIGNIFICANT CHANGE UP (ref 13–44)
MCHC RBC-ENTMCNC: 31.5 PG — SIGNIFICANT CHANGE UP (ref 27–34)
MCHC RBC-ENTMCNC: 32.8 GM/DL — SIGNIFICANT CHANGE UP (ref 32–36)
MCV RBC AUTO: 96 FL — SIGNIFICANT CHANGE UP (ref 80–100)
MONOCYTES # BLD AUTO: 1.61 K/UL — HIGH (ref 0–0.9)
MONOCYTES NFR BLD AUTO: 15 % — HIGH (ref 2–14)
NEUTROPHILS # BLD AUTO: 6.1 K/UL — SIGNIFICANT CHANGE UP (ref 1.8–7.4)
NEUTROPHILS NFR BLD AUTO: 56 % — SIGNIFICANT CHANGE UP (ref 43–77)
NRBC # BLD: SIGNIFICANT CHANGE UP /100 WBCS (ref 0–0)
PLATELET # BLD AUTO: 244 K/UL — SIGNIFICANT CHANGE UP (ref 150–400)
POTASSIUM SERPL-MCNC: 4.3 MMOL/L — SIGNIFICANT CHANGE UP (ref 3.5–5.3)
POTASSIUM SERPL-SCNC: 4.3 MMOL/L — SIGNIFICANT CHANGE UP (ref 3.5–5.3)
PROT SERPL-MCNC: 7.7 G/DL — SIGNIFICANT CHANGE UP (ref 6–8.3)
PROTHROM AB SERPL-ACNC: 39.5 SEC — HIGH (ref 10–12.9)
RBC # BLD: 4.76 M/UL — SIGNIFICANT CHANGE UP (ref 3.8–5.2)
RBC # FLD: 14.3 % — SIGNIFICANT CHANGE UP (ref 10.3–14.5)
SODIUM SERPL-SCNC: 138 MMOL/L — SIGNIFICANT CHANGE UP (ref 135–145)
URATE SERPL-MCNC: 9.6 MG/DL — HIGH (ref 2.5–7)
WBC # BLD: 10.7 K/UL — HIGH (ref 3.8–10.5)
WBC # FLD AUTO: 10.7 K/UL — HIGH (ref 3.8–10.5)

## 2019-06-11 PROCEDURE — 93971 EXTREMITY STUDY: CPT | Mod: 26,LT

## 2019-06-11 PROCEDURE — 96375 TX/PRO/DX INJ NEW DRUG ADDON: CPT

## 2019-06-11 PROCEDURE — 84550 ASSAY OF BLOOD/URIC ACID: CPT

## 2019-06-11 PROCEDURE — 85027 COMPLETE CBC AUTOMATED: CPT

## 2019-06-11 PROCEDURE — 36415 COLL VENOUS BLD VENIPUNCTURE: CPT

## 2019-06-11 PROCEDURE — 80053 COMPREHEN METABOLIC PANEL: CPT

## 2019-06-11 PROCEDURE — 85652 RBC SED RATE AUTOMATED: CPT

## 2019-06-11 PROCEDURE — 93971 EXTREMITY STUDY: CPT

## 2019-06-11 PROCEDURE — 85610 PROTHROMBIN TIME: CPT

## 2019-06-11 PROCEDURE — 96374 THER/PROPH/DIAG INJ IV PUSH: CPT

## 2019-06-11 PROCEDURE — 99284 EMERGENCY DEPT VISIT MOD MDM: CPT | Mod: 25

## 2019-06-11 PROCEDURE — 83605 ASSAY OF LACTIC ACID: CPT

## 2019-06-11 PROCEDURE — 85730 THROMBOPLASTIN TIME PARTIAL: CPT

## 2019-06-11 PROCEDURE — 99284 EMERGENCY DEPT VISIT MOD MDM: CPT

## 2019-06-11 PROCEDURE — 87040 BLOOD CULTURE FOR BACTERIA: CPT

## 2019-06-11 RX ORDER — SODIUM CHLORIDE 9 MG/ML
1000 INJECTION INTRAMUSCULAR; INTRAVENOUS; SUBCUTANEOUS ONCE
Refills: 0 | Status: DISCONTINUED | OUTPATIENT
Start: 2019-06-11 | End: 2019-06-15

## 2019-06-11 RX ORDER — MORPHINE SULFATE 50 MG/1
4 CAPSULE, EXTENDED RELEASE ORAL ONCE
Refills: 0 | Status: DISCONTINUED | OUTPATIENT
Start: 2019-06-11 | End: 2019-06-11

## 2019-06-11 RX ORDER — ONDANSETRON 8 MG/1
4 TABLET, FILM COATED ORAL ONCE
Refills: 0 | Status: COMPLETED | OUTPATIENT
Start: 2019-06-11 | End: 2019-06-11

## 2019-06-11 RX ADMIN — MORPHINE SULFATE 4 MILLIGRAM(S): 50 CAPSULE, EXTENDED RELEASE ORAL at 10:12

## 2019-06-11 RX ADMIN — MORPHINE SULFATE 4 MILLIGRAM(S): 50 CAPSULE, EXTENDED RELEASE ORAL at 09:57

## 2019-06-11 RX ADMIN — ONDANSETRON 4 MILLIGRAM(S): 8 TABLET, FILM COATED ORAL at 09:57

## 2019-06-11 RX ADMIN — Medication 60 MILLIGRAM(S): at 09:57

## 2019-06-11 NOTE — ED PROVIDER NOTE - OBJECTIVE STATEMENT
79 y/o F with hx of gout with c/o left ankle pain, swelling, redness x 3 days.  Pt states she was seen at the urgent care yesterday and told it was arthritis. Pt denies fevers, trauma.

## 2019-06-11 NOTE — ED ADULT NURSE NOTE - CHPI ED NUR SYMPTOMS NEG
no rash/no chills/no decreased eating/drinking/no cough/no abdominal pain/no fever/no headache/no shortness of breath/no vomiting/no diarrhea

## 2019-06-11 NOTE — ED PROVIDER NOTE - CLINICAL SUMMARY MEDICAL DECISION MAKING FREE TEXT BOX
pt with left foot redness, swelling, tenderness with hx of gout.  gout vs cellulitis.  labs, imaging, pain control

## 2019-06-16 LAB
CULTURE RESULTS: SIGNIFICANT CHANGE UP
CULTURE RESULTS: SIGNIFICANT CHANGE UP
SPECIMEN SOURCE: SIGNIFICANT CHANGE UP
SPECIMEN SOURCE: SIGNIFICANT CHANGE UP

## 2019-07-09 ENCOUNTER — INPATIENT (INPATIENT)
Facility: HOSPITAL | Age: 79
LOS: 1 days | Discharge: HOME | DRG: 603 | End: 2019-07-11
Attending: INTERNAL MEDICINE | Admitting: INTERNAL MEDICINE
Payer: MEDICARE

## 2019-07-09 VITALS
SYSTOLIC BLOOD PRESSURE: 140 MMHG | OXYGEN SATURATION: 98 % | TEMPERATURE: 98 F | HEART RATE: 60 BPM | HEIGHT: 67 IN | RESPIRATION RATE: 18 BRPM | DIASTOLIC BLOOD PRESSURE: 95 MMHG | WEIGHT: 225.09 LBS

## 2019-07-09 DIAGNOSIS — Z98.49 CATARACT EXTRACTION STATUS, UNSPECIFIED EYE: Chronic | ICD-10-CM

## 2019-07-09 DIAGNOSIS — L03.116 CELLULITIS OF LEFT LOWER LIMB: ICD-10-CM

## 2019-07-09 LAB
ALBUMIN SERPL ELPH-MCNC: 3 G/DL — LOW (ref 3.3–5)
ALP SERPL-CCNC: 177 U/L — HIGH (ref 30–120)
ALT FLD-CCNC: 70 U/L DA — HIGH (ref 10–60)
ANION GAP SERPL CALC-SCNC: 2 MMOL/L — LOW (ref 5–17)
APPEARANCE UR: ABNORMAL
APTT BLD: 34 SEC — SIGNIFICANT CHANGE UP (ref 28.5–37)
AST SERPL-CCNC: 70 U/L — HIGH (ref 10–40)
BASOPHILS # BLD AUTO: 0.04 K/UL — SIGNIFICANT CHANGE UP (ref 0–0.2)
BASOPHILS NFR BLD AUTO: 0.4 % — SIGNIFICANT CHANGE UP (ref 0–2)
BILIRUB SERPL-MCNC: 0.7 MG/DL — SIGNIFICANT CHANGE UP (ref 0.2–1.2)
BILIRUB UR-MCNC: NEGATIVE — SIGNIFICANT CHANGE UP
BUN SERPL-MCNC: 42 MG/DL — HIGH (ref 7–23)
CALCIUM SERPL-MCNC: 9 MG/DL — SIGNIFICANT CHANGE UP (ref 8.4–10.5)
CHLORIDE SERPL-SCNC: 102 MMOL/L — SIGNIFICANT CHANGE UP (ref 96–108)
CO2 SERPL-SCNC: 33 MMOL/L — HIGH (ref 22–31)
COLOR SPEC: YELLOW — SIGNIFICANT CHANGE UP
CREAT SERPL-MCNC: 1.9 MG/DL — HIGH (ref 0.5–1.3)
DIFF PNL FLD: ABNORMAL
EOSINOPHIL # BLD AUTO: 0.07 K/UL — SIGNIFICANT CHANGE UP (ref 0–0.5)
EOSINOPHIL NFR BLD AUTO: 0.7 % — SIGNIFICANT CHANGE UP (ref 0–6)
GLUCOSE BLDC GLUCOMTR-MCNC: 115 MG/DL — HIGH (ref 70–99)
GLUCOSE BLDC GLUCOMTR-MCNC: 255 MG/DL — HIGH (ref 70–99)
GLUCOSE SERPL-MCNC: 143 MG/DL — HIGH (ref 70–99)
GLUCOSE UR QL: NEGATIVE MG/DL — SIGNIFICANT CHANGE UP
HCT VFR BLD CALC: 46.1 % — HIGH (ref 34.5–45)
HGB BLD-MCNC: 15.2 G/DL — SIGNIFICANT CHANGE UP (ref 11.5–15.5)
IMM GRANULOCYTES NFR BLD AUTO: 0.5 % — SIGNIFICANT CHANGE UP (ref 0–1.5)
INR BLD: 2.34 RATIO — HIGH (ref 0.88–1.16)
KETONES UR-MCNC: NEGATIVE — SIGNIFICANT CHANGE UP
LACTATE SERPL-SCNC: 0.9 MMOL/L — SIGNIFICANT CHANGE UP (ref 0.7–2)
LEUKOCYTE ESTERASE UR-ACNC: ABNORMAL
LYMPHOCYTES # BLD AUTO: 1.98 K/UL — SIGNIFICANT CHANGE UP (ref 1–3.3)
LYMPHOCYTES # BLD AUTO: 20.1 % — SIGNIFICANT CHANGE UP (ref 13–44)
MCHC RBC-ENTMCNC: 32.1 PG — SIGNIFICANT CHANGE UP (ref 27–34)
MCHC RBC-ENTMCNC: 33 GM/DL — SIGNIFICANT CHANGE UP (ref 32–36)
MCV RBC AUTO: 97.5 FL — SIGNIFICANT CHANGE UP (ref 80–100)
MONOCYTES # BLD AUTO: 1.48 K/UL — HIGH (ref 0–0.9)
MONOCYTES NFR BLD AUTO: 15 % — HIGH (ref 2–14)
NEUTROPHILS # BLD AUTO: 6.23 K/UL — SIGNIFICANT CHANGE UP (ref 1.8–7.4)
NEUTROPHILS NFR BLD AUTO: 63.3 % — SIGNIFICANT CHANGE UP (ref 43–77)
NITRITE UR-MCNC: POSITIVE
NRBC # BLD: 0 /100 WBCS — SIGNIFICANT CHANGE UP (ref 0–0)
PH UR: 6 — SIGNIFICANT CHANGE UP (ref 5–8)
PLATELET # BLD AUTO: 273 K/UL — SIGNIFICANT CHANGE UP (ref 150–400)
POTASSIUM SERPL-MCNC: 4.5 MMOL/L — SIGNIFICANT CHANGE UP (ref 3.5–5.3)
POTASSIUM SERPL-SCNC: 4.5 MMOL/L — SIGNIFICANT CHANGE UP (ref 3.5–5.3)
PROT SERPL-MCNC: 7.5 G/DL — SIGNIFICANT CHANGE UP (ref 6–8.3)
PROT UR-MCNC: 15 MG/DL
PROTHROM AB SERPL-ACNC: 26.2 SEC — HIGH (ref 10–12.9)
RBC # BLD: 4.73 M/UL — SIGNIFICANT CHANGE UP (ref 3.8–5.2)
RBC # FLD: 14.7 % — HIGH (ref 10.3–14.5)
SODIUM SERPL-SCNC: 137 MMOL/L — SIGNIFICANT CHANGE UP (ref 135–145)
SP GR SPEC: 1.01 — SIGNIFICANT CHANGE UP (ref 1.01–1.02)
UROBILINOGEN FLD QL: NEGATIVE MG/DL — SIGNIFICANT CHANGE UP
WBC # BLD: 9.85 K/UL — SIGNIFICANT CHANGE UP (ref 3.8–10.5)
WBC # FLD AUTO: 9.85 K/UL — SIGNIFICANT CHANGE UP (ref 3.8–10.5)

## 2019-07-09 PROCEDURE — 93010 ELECTROCARDIOGRAM REPORT: CPT

## 2019-07-09 PROCEDURE — 71045 X-RAY EXAM CHEST 1 VIEW: CPT | Mod: 26

## 2019-07-09 PROCEDURE — 99285 EMERGENCY DEPT VISIT HI MDM: CPT

## 2019-07-09 PROCEDURE — 73590 X-RAY EXAM OF LOWER LEG: CPT | Mod: 26,LT

## 2019-07-09 PROCEDURE — 73610 X-RAY EXAM OF ANKLE: CPT | Mod: 26,LT

## 2019-07-09 PROCEDURE — 93971 EXTREMITY STUDY: CPT | Mod: 26,LT

## 2019-07-09 RX ORDER — ATENOLOL 25 MG/1
1 TABLET ORAL
Qty: 0 | Refills: 0 | DISCHARGE

## 2019-07-09 RX ORDER — DEXTROSE 50 % IN WATER 50 %
25 SYRINGE (ML) INTRAVENOUS ONCE
Refills: 0 | Status: DISCONTINUED | OUTPATIENT
Start: 2019-07-09 | End: 2019-07-11

## 2019-07-09 RX ORDER — ZOLPIDEM TARTRATE 10 MG/1
5 TABLET ORAL AT BEDTIME
Refills: 0 | Status: DISCONTINUED | OUTPATIENT
Start: 2019-07-09 | End: 2019-07-11

## 2019-07-09 RX ORDER — DULAGLUTIDE 4.5 MG/.5ML
0 INJECTION, SOLUTION SUBCUTANEOUS
Qty: 0 | Refills: 0 | DISCHARGE

## 2019-07-09 RX ORDER — LEVOTHYROXINE SODIUM 125 MCG
75 TABLET ORAL DAILY
Refills: 0 | Status: DISCONTINUED | OUTPATIENT
Start: 2019-07-09 | End: 2019-07-11

## 2019-07-09 RX ORDER — ASPIRIN/CALCIUM CARB/MAGNESIUM 324 MG
1 TABLET ORAL
Qty: 0 | Refills: 0 | DISCHARGE

## 2019-07-09 RX ORDER — ATENOLOL 25 MG/1
0 TABLET ORAL
Qty: 0 | Refills: 0 | DISCHARGE

## 2019-07-09 RX ORDER — ACETAMINOPHEN 500 MG
650 TABLET ORAL EVERY 6 HOURS
Refills: 0 | Status: DISCONTINUED | OUTPATIENT
Start: 2019-07-09 | End: 2019-07-11

## 2019-07-09 RX ORDER — SPIRONOLACTONE 25 MG/1
1 TABLET, FILM COATED ORAL
Qty: 0 | Refills: 0 | DISCHARGE

## 2019-07-09 RX ORDER — INSULIN LISPRO 100/ML
VIAL (ML) SUBCUTANEOUS
Refills: 0 | Status: DISCONTINUED | OUTPATIENT
Start: 2019-07-09 | End: 2019-07-11

## 2019-07-09 RX ORDER — BUDESONIDE AND FORMOTEROL FUMARATE DIHYDRATE 160; 4.5 UG/1; UG/1
2 AEROSOL RESPIRATORY (INHALATION)
Refills: 0 | Status: DISCONTINUED | OUTPATIENT
Start: 2019-07-09 | End: 2019-07-11

## 2019-07-09 RX ORDER — INSULIN DEGLUDEC 100 U/ML
0 INJECTION, SOLUTION SUBCUTANEOUS
Qty: 0 | Refills: 0 | DISCHARGE

## 2019-07-09 RX ORDER — WARFARIN SODIUM 2.5 MG/1
6 TABLET ORAL ONCE
Refills: 0 | Status: COMPLETED | OUTPATIENT
Start: 2019-07-09 | End: 2019-07-09

## 2019-07-09 RX ORDER — ATENOLOL 25 MG/1
25 TABLET ORAL DAILY
Refills: 0 | Status: DISCONTINUED | OUTPATIENT
Start: 2019-07-09 | End: 2019-07-09

## 2019-07-09 RX ORDER — DEXTROSE 50 % IN WATER 50 %
12.5 SYRINGE (ML) INTRAVENOUS ONCE
Refills: 0 | Status: DISCONTINUED | OUTPATIENT
Start: 2019-07-09 | End: 2019-07-11

## 2019-07-09 RX ORDER — AZTREONAM 2 G
500 VIAL (EA) INJECTION ONCE
Refills: 0 | Status: COMPLETED | OUTPATIENT
Start: 2019-07-09 | End: 2019-07-09

## 2019-07-09 RX ORDER — VANCOMYCIN HCL 1 G
1000 VIAL (EA) INTRAVENOUS ONCE
Refills: 0 | Status: COMPLETED | OUTPATIENT
Start: 2019-07-09 | End: 2019-07-09

## 2019-07-09 RX ORDER — ATENOLOL 25 MG/1
50 TABLET ORAL AT BEDTIME
Refills: 0 | Status: DISCONTINUED | OUTPATIENT
Start: 2019-07-09 | End: 2019-07-11

## 2019-07-09 RX ORDER — INSULIN LISPRO 100/ML
VIAL (ML) SUBCUTANEOUS AT BEDTIME
Refills: 0 | Status: DISCONTINUED | OUTPATIENT
Start: 2019-07-09 | End: 2019-07-11

## 2019-07-09 RX ORDER — AZTREONAM 2 G
1000 VIAL (EA) INJECTION EVERY 8 HOURS
Refills: 0 | Status: DISCONTINUED | OUTPATIENT
Start: 2019-07-10 | End: 2019-07-10

## 2019-07-09 RX ORDER — FUROSEMIDE 40 MG
1 TABLET ORAL
Qty: 0 | Refills: 0 | DISCHARGE

## 2019-07-09 RX ORDER — INSULIN DEGLUDEC 100 U/ML
76 INJECTION, SOLUTION SUBCUTANEOUS
Qty: 0 | Refills: 0 | DISCHARGE

## 2019-07-09 RX ORDER — INSULIN ASPART 100 [IU]/ML
0 INJECTION, SOLUTION SUBCUTANEOUS
Qty: 0 | Refills: 0 | DISCHARGE

## 2019-07-09 RX ORDER — VANCOMYCIN HCL 1 G
1000 VIAL (EA) INTRAVENOUS EVERY 12 HOURS
Refills: 0 | Status: DISCONTINUED | OUTPATIENT
Start: 2019-07-10 | End: 2019-07-11

## 2019-07-09 RX ORDER — SODIUM CHLORIDE 9 MG/ML
1000 INJECTION, SOLUTION INTRAVENOUS
Refills: 0 | Status: DISCONTINUED | OUTPATIENT
Start: 2019-07-09 | End: 2019-07-11

## 2019-07-09 RX ORDER — DEXTROSE 50 % IN WATER 50 %
15 SYRINGE (ML) INTRAVENOUS ONCE
Refills: 0 | Status: DISCONTINUED | OUTPATIENT
Start: 2019-07-09 | End: 2019-07-11

## 2019-07-09 RX ORDER — INSULIN LISPRO 100/ML
5 VIAL (ML) SUBCUTANEOUS
Refills: 0 | Status: DISCONTINUED | OUTPATIENT
Start: 2019-07-09 | End: 2019-07-11

## 2019-07-09 RX ORDER — GLUCAGON INJECTION, SOLUTION 0.5 MG/.1ML
1 INJECTION, SOLUTION SUBCUTANEOUS ONCE
Refills: 0 | Status: DISCONTINUED | OUTPATIENT
Start: 2019-07-09 | End: 2019-07-11

## 2019-07-09 RX ORDER — MOMETASONE FUROATE AND FORMOTEROL FUMARATE DIHYDRATE 200; 5 UG/1; UG/1
2 AEROSOL RESPIRATORY (INHALATION)
Qty: 0 | Refills: 0 | DISCHARGE

## 2019-07-09 RX ORDER — ASPIRIN/CALCIUM CARB/MAGNESIUM 324 MG
81 TABLET ORAL DAILY
Refills: 0 | Status: DISCONTINUED | OUTPATIENT
Start: 2019-07-09 | End: 2019-07-11

## 2019-07-09 RX ORDER — LACTOBACILLUS ACIDOPHILUS 100MM CELL
1 CAPSULE ORAL
Refills: 0 | Status: DISCONTINUED | OUTPATIENT
Start: 2019-07-09 | End: 2019-07-11

## 2019-07-09 RX ORDER — INSULIN GLARGINE 100 [IU]/ML
60 INJECTION, SOLUTION SUBCUTANEOUS EVERY MORNING
Refills: 0 | Status: DISCONTINUED | OUTPATIENT
Start: 2019-07-09 | End: 2019-07-11

## 2019-07-09 RX ADMIN — WARFARIN SODIUM 6 MILLIGRAM(S): 2.5 TABLET ORAL at 22:22

## 2019-07-09 RX ADMIN — Medication 50 MILLIGRAM(S): at 20:05

## 2019-07-09 RX ADMIN — Medication 500 MILLIGRAM(S): at 20:35

## 2019-07-09 RX ADMIN — ATENOLOL 50 MILLIGRAM(S): 25 TABLET ORAL at 22:22

## 2019-07-09 RX ADMIN — ZOLPIDEM TARTRATE 5 MILLIGRAM(S): 10 TABLET ORAL at 22:22

## 2019-07-09 RX ADMIN — Medication 250 MILLIGRAM(S): at 20:40

## 2019-07-09 NOTE — ED PROVIDER NOTE - ENMT, MLM
Airway patent, Nasal mucosa clear. Mouth with normal mucosa. Throat has no vesicles, no oropharyngeal exudates and uvula is midline. Airway patent, no facial swelling

## 2019-07-09 NOTE — ED ADULT TRIAGE NOTE - CHIEF COMPLAINT QUOTE
"My left ankle has pain and swelling and I've been treated for gout for the past month but now my doctor thinks it may be cellulitis."

## 2019-07-09 NOTE — ED PROVIDER NOTE - OBJECTIVE STATEMENT
80 y/o female with a PMHx of Gout, s/p Left knee replacement, IDDM, COPD, Afib, Hypothyroidism presents to the ED c/o left ankle swelling for the past few weeks. Seen in [place] multiple times for the same issue. Saw rheumatologist today for continued left ankle swelling. Was told she has cellulitis and needs IV abx. Denies fevers, drainage and any other symptoms. Took percocet at home with some relief. PCP: Rhianna. 78 y/o female with a PMHx of Gout, s/p Left knee replacement, IDDM, COPD, Afib, Hypothyroidism presents to the ED c/o left ankle swelling for the past few weeks. Seen in urgent care on June 10th and [place] June 11th for the same issue (x-rays and doppler negative) Was put on prednisone for 5 days for suspected gout. Saw rheumatologist today for continued left ankle swelling. Was told she has cellulitis and needs IV abx and admission (especially due to concerns that patient is a diabetic and history of left knee and hip replacement). Denies fevers, drainage and any other symptoms. Took percocet at home with some relief. PCP: Rhianna.  Ilya Morel  Pulmonologist [place]

## 2019-07-09 NOTE — ED PROVIDER NOTE - PROGRESS NOTE DETAILS
Discussed the results of all diagnostic testing in ED. spoke with Dr. Hamlin who kindly accepts patient for admission

## 2019-07-09 NOTE — ED PROVIDER NOTE - MUSCULOSKELETAL, MLM
Spine appears normal, range of motion is not limited, no muscle or joint tenderness. +Moderate swelling left ankle, mild erythema of skin

## 2019-07-09 NOTE — ED PROVIDER NOTE - SKIN, MLM
Skin normal color for race, warm, dry and intact.  +multiple superficial varicosities at leg, healed left knee surgical scar.

## 2019-07-09 NOTE — ED PROVIDER NOTE - CLINICAL SUMMARY MEDICAL DECISION MAKING FREE TEXT BOX
Impression is cellulitis of leg. Plan IV abx, admission and may need venous doppler, ortho evaluation.

## 2019-07-09 NOTE — ED ADULT NURSE NOTE - OBJECTIVE STATEMENT
80 y/o female presents from rheumatologist for left lower extremity cellulitis. She has been dx with gout by elevated uric acid >9, 1 month prior and has been tx with steroids and sx have continued to progress. She was [facility] this [facility] for the same sx and doppler and blood work was negative for any findings. She denies any fever, trauma, CP, SOB, N/V/D. HR irregular with +2 palpable pulses in all extremities.

## 2019-07-10 DIAGNOSIS — E11.69 TYPE 2 DIABETES MELLITUS WITH OTHER SPECIFIED COMPLICATION: ICD-10-CM

## 2019-07-10 DIAGNOSIS — N18.3 CHRONIC KIDNEY DISEASE, STAGE 3 (MODERATE): ICD-10-CM

## 2019-07-10 DIAGNOSIS — J44.9 CHRONIC OBSTRUCTIVE PULMONARY DISEASE, UNSPECIFIED: ICD-10-CM

## 2019-07-10 DIAGNOSIS — Z29.9 ENCOUNTER FOR PROPHYLACTIC MEASURES, UNSPECIFIED: ICD-10-CM

## 2019-07-10 DIAGNOSIS — I48.91 UNSPECIFIED ATRIAL FIBRILLATION: ICD-10-CM

## 2019-07-10 DIAGNOSIS — I10 ESSENTIAL (PRIMARY) HYPERTENSION: ICD-10-CM

## 2019-07-10 DIAGNOSIS — E03.9 HYPOTHYROIDISM, UNSPECIFIED: ICD-10-CM

## 2019-07-10 DIAGNOSIS — L03.116 CELLULITIS OF LEFT LOWER LIMB: ICD-10-CM

## 2019-07-10 LAB
ANION GAP SERPL CALC-SCNC: 1 MMOL/L — LOW (ref 5–17)
BASOPHILS # BLD AUTO: 0.03 K/UL — SIGNIFICANT CHANGE UP (ref 0–0.2)
BASOPHILS NFR BLD AUTO: 0.3 % — SIGNIFICANT CHANGE UP (ref 0–2)
BUN SERPL-MCNC: 39 MG/DL — HIGH (ref 7–23)
CALCIUM SERPL-MCNC: 8.6 MG/DL — SIGNIFICANT CHANGE UP (ref 8.4–10.5)
CHLORIDE SERPL-SCNC: 104 MMOL/L — SIGNIFICANT CHANGE UP (ref 96–108)
CHOLEST SERPL-MCNC: 141 MG/DL — SIGNIFICANT CHANGE UP (ref 10–199)
CO2 SERPL-SCNC: 32 MMOL/L — HIGH (ref 22–31)
CREAT SERPL-MCNC: 1.73 MG/DL — HIGH (ref 0.5–1.3)
EOSINOPHIL # BLD AUTO: 0.12 K/UL — SIGNIFICANT CHANGE UP (ref 0–0.5)
EOSINOPHIL NFR BLD AUTO: 1.3 % — SIGNIFICANT CHANGE UP (ref 0–6)
GLUCOSE BLDC GLUCOMTR-MCNC: 103 MG/DL — HIGH (ref 70–99)
GLUCOSE BLDC GLUCOMTR-MCNC: 143 MG/DL — HIGH (ref 70–99)
GLUCOSE BLDC GLUCOMTR-MCNC: 177 MG/DL — HIGH (ref 70–99)
GLUCOSE BLDC GLUCOMTR-MCNC: 219 MG/DL — HIGH (ref 70–99)
GLUCOSE SERPL-MCNC: 115 MG/DL — HIGH (ref 70–99)
HBA1C BLD-MCNC: 9.3 % — HIGH (ref 4–5.6)
HCT VFR BLD CALC: 43.7 % — SIGNIFICANT CHANGE UP (ref 34.5–45)
HDLC SERPL-MCNC: 62 MG/DL — SIGNIFICANT CHANGE UP
HGB BLD-MCNC: 14.4 G/DL — SIGNIFICANT CHANGE UP (ref 11.5–15.5)
IMM GRANULOCYTES NFR BLD AUTO: 0.3 % — SIGNIFICANT CHANGE UP (ref 0–1.5)
INR BLD: 2.33 RATIO — HIGH (ref 0.88–1.16)
LIPID PNL WITH DIRECT LDL SERPL: 50 MG/DL — SIGNIFICANT CHANGE UP
LYMPHOCYTES # BLD AUTO: 1.33 K/UL — SIGNIFICANT CHANGE UP (ref 1–3.3)
LYMPHOCYTES # BLD AUTO: 14.5 % — SIGNIFICANT CHANGE UP (ref 13–44)
MAGNESIUM SERPL-MCNC: 1.9 MG/DL — SIGNIFICANT CHANGE UP (ref 1.6–2.6)
MCHC RBC-ENTMCNC: 31.9 PG — SIGNIFICANT CHANGE UP (ref 27–34)
MCHC RBC-ENTMCNC: 33 GM/DL — SIGNIFICANT CHANGE UP (ref 32–36)
MCV RBC AUTO: 96.7 FL — SIGNIFICANT CHANGE UP (ref 80–100)
MONOCYTES # BLD AUTO: 1.37 K/UL — HIGH (ref 0–0.9)
MONOCYTES NFR BLD AUTO: 14.9 % — HIGH (ref 2–14)
NEUTROPHILS # BLD AUTO: 6.3 K/UL — SIGNIFICANT CHANGE UP (ref 1.8–7.4)
NEUTROPHILS NFR BLD AUTO: 68.7 % — SIGNIFICANT CHANGE UP (ref 43–77)
NRBC # BLD: 0 /100 WBCS — SIGNIFICANT CHANGE UP (ref 0–0)
PHOSPHATE SERPL-MCNC: 2.8 MG/DL — SIGNIFICANT CHANGE UP (ref 2.5–4.5)
PLATELET # BLD AUTO: 265 K/UL — SIGNIFICANT CHANGE UP (ref 150–400)
POTASSIUM SERPL-MCNC: 4.3 MMOL/L — SIGNIFICANT CHANGE UP (ref 3.5–5.3)
POTASSIUM SERPL-SCNC: 4.3 MMOL/L — SIGNIFICANT CHANGE UP (ref 3.5–5.3)
PROCALCITONIN SERPL-MCNC: 0.15 NG/ML — HIGH (ref 0.02–0.1)
PROTHROM AB SERPL-ACNC: 26.1 SEC — HIGH (ref 10–12.9)
RBC # BLD: 4.52 M/UL — SIGNIFICANT CHANGE UP (ref 3.8–5.2)
RBC # FLD: 14.9 % — HIGH (ref 10.3–14.5)
SODIUM SERPL-SCNC: 137 MMOL/L — SIGNIFICANT CHANGE UP (ref 135–145)
TOTAL CHOLESTEROL/HDL RATIO MEASUREMENT: 2.3 RATIO — LOW (ref 3.3–7.1)
TRIGL SERPL-MCNC: 143 MG/DL — SIGNIFICANT CHANGE UP (ref 10–149)
URATE SERPL-MCNC: 7.6 MG/DL — HIGH (ref 2.5–7)
WBC # BLD: 9.18 K/UL — SIGNIFICANT CHANGE UP (ref 3.8–10.5)
WBC # FLD AUTO: 9.18 K/UL — SIGNIFICANT CHANGE UP (ref 3.8–10.5)

## 2019-07-10 PROCEDURE — 76775 US EXAM ABDO BACK WALL LIM: CPT | Mod: 26

## 2019-07-10 RX ORDER — WARFARIN SODIUM 2.5 MG/1
6 TABLET ORAL ONCE
Refills: 0 | Status: COMPLETED | OUTPATIENT
Start: 2019-07-10 | End: 2019-07-10

## 2019-07-10 RX ORDER — ALLOPURINOL 300 MG
100 TABLET ORAL
Refills: 0 | Status: DISCONTINUED | OUTPATIENT
Start: 2019-07-10 | End: 2019-07-11

## 2019-07-10 RX ORDER — TRAMADOL HYDROCHLORIDE 50 MG/1
50 TABLET ORAL ONCE
Refills: 0 | Status: DISCONTINUED | OUTPATIENT
Start: 2019-07-10 | End: 2019-07-10

## 2019-07-10 RX ORDER — SOD,AMMONIUM,POTASSIUM LACTATE
1 CREAM (GRAM) TOPICAL
Refills: 0 | Status: DISCONTINUED | OUTPATIENT
Start: 2019-07-10 | End: 2019-07-11

## 2019-07-10 RX ORDER — COLCHICINE 0.6 MG
0.6 TABLET ORAL
Refills: 0 | Status: DISCONTINUED | OUTPATIENT
Start: 2019-07-10 | End: 2019-07-11

## 2019-07-10 RX ORDER — FUROSEMIDE 40 MG
40 TABLET ORAL
Refills: 0 | Status: DISCONTINUED | OUTPATIENT
Start: 2019-07-10 | End: 2019-07-11

## 2019-07-10 RX ORDER — SPIRONOLACTONE 25 MG/1
25 TABLET, FILM COATED ORAL
Refills: 0 | Status: DISCONTINUED | OUTPATIENT
Start: 2019-07-10 | End: 2019-07-11

## 2019-07-10 RX ADMIN — Medication 4: at 17:13

## 2019-07-10 RX ADMIN — TRAMADOL HYDROCHLORIDE 50 MILLIGRAM(S): 50 TABLET ORAL at 19:56

## 2019-07-10 RX ADMIN — Medication 1 TABLET(S): at 12:13

## 2019-07-10 RX ADMIN — WARFARIN SODIUM 6 MILLIGRAM(S): 2.5 TABLET ORAL at 21:39

## 2019-07-10 RX ADMIN — Medication 250 MILLIGRAM(S): at 08:34

## 2019-07-10 RX ADMIN — Medication 50 MILLIGRAM(S): at 04:00

## 2019-07-10 RX ADMIN — Medication 81 MILLIGRAM(S): at 12:14

## 2019-07-10 RX ADMIN — Medication 1 TABLET(S): at 17:12

## 2019-07-10 RX ADMIN — Medication 75 MICROGRAM(S): at 07:08

## 2019-07-10 RX ADMIN — TRAMADOL HYDROCHLORIDE 50 MILLIGRAM(S): 50 TABLET ORAL at 20:36

## 2019-07-10 RX ADMIN — Medication 100 MILLIGRAM(S): at 17:12

## 2019-07-10 RX ADMIN — Medication 1 TABLET(S): at 08:34

## 2019-07-10 RX ADMIN — Medication 250 MILLIGRAM(S): at 19:56

## 2019-07-10 RX ADMIN — ZOLPIDEM TARTRATE 5 MILLIGRAM(S): 10 TABLET ORAL at 21:39

## 2019-07-10 RX ADMIN — Medication 1 APPLICATION(S): at 19:56

## 2019-07-10 RX ADMIN — Medication 50 MILLIGRAM(S): at 12:15

## 2019-07-10 RX ADMIN — Medication 5 UNIT(S): at 17:13

## 2019-07-10 RX ADMIN — ATENOLOL 50 MILLIGRAM(S): 25 TABLET ORAL at 21:39

## 2019-07-10 NOTE — H&P ADULT - NSHPPHYSICALEXAM_GEN_ALL_CORE
Vital Signs Last 24 Hrs  T(C): 36.8 (10 Jul 2019 09:06), Max: 36.9 (09 Jul 2019 21:25)  T(F): 98.3 (10 Jul 2019 09:06), Max: 98.4 (09 Jul 2019 21:25)  HR: 65 (10 Jul 2019 09:06) (60 - 78)  BP: 114/68 (10 Jul 2019 09:06) (100/51 - 140/95)  BP(mean): --  RR: 19 (10 Jul 2019 09:06) (17 - 19)  SpO2: 95% (10 Jul 2019 09:06) (95% - 98%) Vital Signs Last 24 Hrs  T(C): 36.8 (10 Jul 2019 09:06), Max: 36.9 (09 Jul 2019 21:25)  T(F): 98.3 (10 Jul 2019 09:06), Max: 98.4 (09 Jul 2019 21:25)  HR: 65 (10 Jul 2019 09:06) (60 - 78)  BP: 114/68 (10 Jul 2019 09:06) (100/51 - 140/95)  BP(mean): --  RR: 19 (10 Jul 2019 09:06) (17 - 19)  SpO2: 95% (10 Jul 2019 09:06) (95% - 98%)    PHYSICAL EXAM:  GENERAL: NAD, well-groomed, well-developed  HEAD:  Atraumatic, Normocephalic  EYES: EOMI, PERRLA, conjunctiva and sclera clear  ENMT:  Moist mucous membranes, No lesions  NECK: Supple, No JVD, Normal thyroid  CHEST/LUNG: Clear to auscultation bilaterally; No rales, rhonchi, wheezing, or rubs  HEART: Regular rate and rhythm; No murmurs, rubs, or gallops  ABDOMEN: Soft, Nontender, Nondistended; Bowel sounds present  EXTREMITIES: Left leg swelling.   MS: No joint swelling or deformity.   LYMPH: No lymphadenopathy noted  SKIN: Redness of left leg.   NERVOUS SYSTEM:  Motor Strength 4/5 B/L upper and lower extremities; DTRs 2+ intact and symmetric  PSYCH:  Alert & Oriented X3, Good concentration.

## 2019-07-10 NOTE — H&P ADULT - NSHPREVIEWOFSYSTEMS_GEN_ALL_CORE
REVIEW OF SYSTEMS:  CONSTITUTIONAL: + fatigue  EYES: No eye pain, or discharge  ENMT:  No sinus or throat pain  NECK: No pain or stiffness  BREASTS: No pain, masses, or nipple discharge  RESPIRATORY: No cough, wheezing, chills or hemoptysis; No shortness of breath  CARDIOVASCULAR: No chest pain, palpitations, dizziness, or leg swelling  GASTROINTESTINAL: No abdominal or epigastric pain. No nausea, vomiting, or hematemesis; No diarrhea or constipation. No melena or hematochezia.  GENITOURINARY: No dysuria, frequency, hematuria, or incontinence  NEUROLOGICAL: No headaches, memory loss, loss of strength, numbness, or tremors  SKIN: + redness of left leg.    LYMPH NODES: No enlarged glands  ENDOCRINE: No heat or cold intolerance; No hair loss; No polydipsia or polyuria  MUSCULOSKELETAL: + left ankle pain.   PSYCHIATRIC: No depression, anxiety, mood swings, or difficulty sleeping  HEME/LYMPH: No easy bruising, or bleeding gums  ALLERGY AND IMMUNOLOGIC: No hives or eczema

## 2019-07-10 NOTE — CONSULT NOTE ADULT - ASSESSMENT
Stable CKD likely diabetic nephropathy/obesity FSGS, renal indices are at baseline.  Cellulitis on antibiotic.  Likely left ankle gouty arthritis.  Bacteriuria.  Agree with Colchicine/Allopurinol.  No ACEI/ARB.  Check urine c/s.  May continue with routine diuretics.  Low purines diet.  Will obtain baseline renal sono and quantify proteinuria.  I educated the patient on the importance of diabetes and BP control and advised outpatient nephrologic f/u.  I also spoke with patient's son and daughter at bedside.  Thank you.

## 2019-07-10 NOTE — H&P ADULT - NSHPSOCIALHISTORY_GEN_ALL_CORE
Social History:    Marital Status:   Occupation: Retired  Lives with: Alone    Substance Use : Denies  Tobacco Usage:  (   ) never smoked   (X) former smoker   (   ) current smoker  (     ) pack year  (        ) last tobacco use date  Alcohol Usage: Denies    (X) Advanced Directives: (X) declined   [  ] health care proxy

## 2019-07-10 NOTE — H&P ADULT - PROBLEM SELECTOR PLAN 2
Patient with CKD 3 possibly due to hypertensive and diabetic kidney disease.   Monitor serial BMP.   Nephrology consult.

## 2019-07-10 NOTE — H&P ADULT - NSICDXPASTSURGICALHX_GEN_ALL_CORE_FT
PAST SURGICAL HISTORY:    hysterectomy due to menometorrhagia/Bleeding       right  hip replacement due to Arthropathy     b/l  eye surgery for ? Ocular Hypertension      Delivery ,  & 1975     in 2006  left  hip replacement due to Arthropathy     S/P Cholecystectomy     S/P Tonsillectomy     Status post cataract extraction

## 2019-07-10 NOTE — CONSULT NOTE ADULT - SUBJECTIVE AND OBJECTIVE BOX
Patient is a 79y old  Female who presents with a chief complaint of Leg swelling and redness. Renal consult was requested for CKD. PMH of COPD, obesity, DM, Atrial Fibrillation, hypothyroidism, gout, chronic leg edema, OA, s/p left knee replacement, who has been having increasing pain, swelling, and redness of left ankle/lower leg. She was seen by rheumatology and was sent in to ER for possible cellulitis. Patient was admitted for IV antibiotics. Patient is feeling better today. No fever or chills. She has been diabetic for over 15 years with less than optimal diabetes control, she is known to have CKD without prior nephrologic evaluations with creatinine of 2 dating back to 2017. She denies known to her retinaopathy bau she did have laser therapy for what she believes was cataract related procedure. No chronic NSAIDS use. No voiding c/o, no frequent UTI's or stone disease. She is on chronic diuretic therapy and tries to adhere to low purines diet. She is a former smoker, no ETOH.    PAST MEDICAL & SURGICAL HISTORY:  Diabetes x 15 years----on insulin 5 years  Obesity  COPD (Chronic Obstructive Pulmonary Disease)  Sleep Apnea--uses device at night  h/o  due menometorrhagia/Bleeding  Hypothyroid after she received radioactive iodine for hyperthyroidism    left hip Arthropathy  Atrial Fibrillation  in , right  hip Arthropathy and in  right hip replacement  right knee Arthropathy  in     left knee Arthropathy  Status post cataract extraction  b/l  eye surgery for ? Ocular Hypertension  S/P Cholecystectomy  S/P Tonsillectomy   Delivery ,  & 1975  hysterectomy due to menometorrhagia/Bleeding  in   left  hip replacement due to Arthropathy    right  hip replacement due to Arthropathy    FAMILY HISTORY:  No pertinent family history in first degree relatives    ALLERGIES:  adhesives (Rash)  contrast media (iodine-based) (Rash)  penicillin (Short breath; Other)    MEDICATIONS  (STANDING):  allopurinol 100 milliGRAM(s) Oral two times a day  aspirin enteric coated 81 milliGRAM(s) Oral daily  ATENolol  Tablet 50 milliGRAM(s) Oral at bedtime  aztreonam  IVPB 1000 milliGRAM(s) IV Intermittent every 8 hours  buDESOnide 160 MICROgram(s)/formoterol 4.5 MICROgram(s) Inhaler 2 Puff(s) Inhalation two times a day  colchicine 0.6 milliGRAM(s) Oral two times a day  dextrose 5%. 1000 milliLiter(s) (50 mL/Hr) IV Continuous <Continuous>  dextrose 50% Injectable 12.5 Gram(s) IV Push once  dextrose 50% Injectable 25 Gram(s) IV Push once  dextrose 50% Injectable 25 Gram(s) IV Push once  furosemide    Tablet 40 milliGRAM(s) Oral two times a day  insulin glargine Injectable (LANTUS) 60 Unit(s) SubCutaneous every morning  insulin lispro (HumaLOG) corrective regimen sliding scale   SubCutaneous three times a day before meals  insulin lispro (HumaLOG) corrective regimen sliding scale   SubCutaneous at bedtime  insulin lispro Injectable (HumaLOG) 5 Unit(s) SubCutaneous three times a day before meals  lactobacillus acidophilus 1 Tablet(s) Oral three times a day with meals  levothyroxine 75 MICROGram(s) Oral daily  spironolactone 25 milliGRAM(s) Oral two times a day  vancomycin  IVPB 1000 milliGRAM(s) IV Intermittent every 12 hours  warfarin 6 milliGRAM(s) Oral once    MEDICATIONS  (PRN):  acetaminophen   Tablet .. 650 milliGRAM(s) Oral every 6 hours PRN Temp greater or equal to 38.5C (101.3F), Mild Pain (1 - 3)  dextrose 40% Gel 15 Gram(s) Oral once PRN Blood Glucose LESS THAN 70 milliGRAM(s)/deciliter  glucagon  Injectable 1 milliGRAM(s) IntraMuscular once PRN Glucose LESS THAN 70 milligrams/deciliter  zolpidem 5 milliGRAM(s) Oral at bedtime PRN Insomnia  zolpidem 5 milliGRAM(s) Oral at bedtime PRN Insomnia    I&O's Detail    Vital Signs Last 24 Hrs  T(C): 36.8 (10 Jul 2019 09:06), Max: 36.9 (2019 21:25)  T(F): 98.3 (10 Jul 2019 09:06), Max: 98.4 (2019 21:25)  HR: 65 (10 Jul 2019 09:06) (60 - 78)  BP: 114/68 (10 Jul 2019 09:06) (100/51 - 140/95)  BP(mean): --  RR: 19 (10 Jul 2019 09:06) (17 - 19)  SpO2: 95% (10 Jul 2019 09:06) (95% - 98%)    PHYSICAL EXAM:  General: NAD  Respiratory: b/l air entry, left base crackles  Cardiovascular: S1 S2 irreg irreg  Gastrointestinal: soft, obese, NT  Extremities: 1 plus left ankle edema with decreased ROM and moderate local tenderness, no skin erythema, mild local hyperthermia     POCT Blood Glucose.: 177 mg/dL (10 Jul 2019 12:15)  POCT Blood Glucose.: 103 mg/dL (10 Jul 2019 07:47)  POCT Blood Glucose.: 255 mg/dL (2019 22:27)  POCT Blood Glucose.: 115 mg/dL (2019 20:03)    07-10    137  |  104  |  39<H>  ----------------------------<  115<H>  4.3   |  32<H>  |  1.73<H>    Ca    8.6      10 Jul 2019 05:51  Phos  2.8     07-10  Mg     1.9     07-10    TPro  7.5  /  Alb  3.0<L>  /  TBili  0.7  /  DBili  x   /  AST  70<H>  /  ALT  70<H>  /  AlkPhos  177<H>      Potassium, Serum: 4.3 mmol/L (07-10 @ 05:51)  Calcium, Total Serum: 8.6 mg/dL (07-10 @ 05:51)  Blood Urea Nitrogen, Serum: 39 mg/dL (07-10 @ 05:51)  Hematocrit: 43.7 % (07-10 @ 05:51)  Hemoglobin: 14.4 g/dL (07-10 @ 05:51)  Hemoglobin: 15.2 g/dL ( 19:35)  Hematocrit: 46.1 % ( 19:35)  Potassium, Serum: 4.5 mmol/L ( 19:35)  Blood Urea Nitrogen, Serum: 42 mg/dL ( 19:35)  Calcium, Total Serum: 9.0 mg/dL ( 19:35)      Creatinine, Serum: 1.73 (07-10 @ 05:51)  Creatinine, Serum: 1.90 ( 19:35)    CBC Full  -  ( 10 Jul 2019 05:51 )  WBC Count : 9.18 K/uL  RBC Count : 4.52 M/uL  Hemoglobin : 14.4 g/dL  Hematocrit : 43.7 %  Platelet Count - Automated : 265 K/uL  Mean Cell Volume : 96.7 fl  Mean Cell Hemoglobin : 31.9 pg  Mean Cell Hemoglobin Concentration : 33.0 gm/dL  Auto Neutrophil # : 6.30 K/uL  Auto Lymphocyte # : 1.33 K/uL  Auto Monocyte # : 1.37 K/uL  Auto Eosinophil # : 0.12 K/uL  Auto Basophil # : 0.03 K/uL  Auto Neutrophil % : 68.7 %  Auto Lymphocyte % : 14.5 %  Auto Monocyte % : 14.9 %  Auto Eosinophil % : 1.3 %  Auto Basophil % : 0.3 %    Urinalysis Basic - ( 2019 21:37 )    Color: Yellow / Appearance: Slightly Turbid / S.015 / pH: x  Gluc: x / Ketone: Negative  / Bili: Negative / Urobili: Negative mg/dL   Blood: x / Protein: 15 mg/dL / Nitrite: Positive   Leuk Esterase: Moderate / RBC: 0-2 /HPF / WBC 26-50   Sq Epi: x / Non Sq Epi: Few / Bacteria: Moderate

## 2019-07-10 NOTE — H&P ADULT - ASSESSMENT
79 years old female with past medical history of COPD, DM, Atrial Fibrillation, hypothyroidism, gout, chronic leg edema, OA, s/p left knee replacement, who has been having increasing pain her left ankle. Patient  was thought to be having gout flair up. She was being treated with medications. Patient had increasing swelling and redness of left leg. She was seen by rheumatology and was sent in to ER for possible cellulitis. Patient was admitted for IV antibiotics as per ER physician.

## 2019-07-10 NOTE — H&P ADULT - NSHPLABSRESULTS_GEN_ALL_CORE
14.4   9.18  )-----------( 265      ( 10 Jul 2019 05:51 )             43.7     10 Jul 2019 05:51    137    |  104    |  39     ----------------------------<  115    4.3     |  32     |  1.73     Ca    8.6        10 Jul 2019 05:51  Phos  2.8       10 Jul 2019 05:51  Mg     1.9       10 Jul 2019 05:51    TPro  7.5    /  Alb  3.0    /  TBili  0.7    /  DBili  x      /  AST  70     /  ALT  70     /  AlkPhos  177    09 Jul 2019 19:35    CAPILLARY BLOOD GLUCOSE  POCT Blood Glucose.: 177 mg/dL (10 Jul 2019 12:15)  POCT Blood Glucose.: 103 mg/dL (10 Jul 2019 07:47)  POCT Blood Glucose.: 255 mg/dL (09 Jul 2019 22:27)  POCT Blood Glucose.: 115 mg/dL (09 Jul 2019 20:03)    PT/INR - ( 10 Jul 2019 05:51 )   PT: 26.1 sec;   INR: 2.33 ratio         PTT - ( 09 Jul 2019 19:35 )  PTT:34.0 sec  07-10 [id number] 9.3    Procalcitonin, Serum: 0.15 ng/mL (07-10-19 @ 12:03)

## 2019-07-10 NOTE — H&P ADULT - HISTORY OF PRESENT ILLNESS
79 years old female with past medical history of COPD, DM, Atrial Fibrillation, hypothyroidism, gout, chronic leg edema, OA, s/p left knee replacement, who has been having increasing pain her left ankle. Patient  was thought to be having gout flair up. She was being treated with medications. Patient had increasing swelling and redness of left leg. She was seen by rheumatology and was sent in to ER for possible cellulitis. Patient was admitted for IV antibiotics as per ER physician.     Patient is feeling better today. As per patient, her swelling and redness are better. She still has some pain in her leg.   patient denies any chest pain or pressure.   No nausea or vomiting.   No fever or chills.

## 2019-07-10 NOTE — CONSULT NOTE ADULT - SUBJECTIVE AND OBJECTIVE BOX
Infectious Diseases Consult by Leticia Jacobson MD    Reason for Consult :    HPI:  79 years old female with past medical history of COPD, DM, Atrial Fibrillation, hypothyroidism, gout, chronic leg edema, varicose veins , OA, s/p left knee and bilateral HIP  replacement, who has been having increasing pain her left ankle. Patient  was thought to be having gout flair up. She was being treated with medications. Patient had increasing swelling and redness of left leg. She was seen by rheumatology and was sent in to ER for possible cellulitis. Patient was admitted for IV antibiotics as per ER physician. Her redness of left foot started 1 month ago was seen in ER and diagnosed as gout     Patient is feeling better today. As per patient, her swelling and redness are better. She still has some pain in her leg.   patient denies any chest pain or pressure.   No nausea or vomiting.   No fever or chills.   Has not taken any antibiotics .      Although she has allergy to PCN she has taken Keflex recently without any reaction       Past Medical & Surgical Hx:  PAST MEDICAL & SURGICAL HISTORY:  Diabetes x 15 years----on insulin 5 years  Obesity  COPD (Chronic Obstructive Pulmonary Disease)  Sleep Apnea--uses device at night  h/o  due menometorrhagia/Bleeding  Hypothyroid after she received radioactive iodine for hyperthyroidism    left hip Arthropathy  Atrial Fibrillation  in , right  hip Arthropathy and in  right hip replacement  right knee Arthropathy  in     left knee Arthropathy  Status post cataract extraction  b/l  eye surgery for ? Ocular Hypertension  S/P Cholecystectomy  S/P Tonsillectomy   Delivery ,  & 1975  hysterectomy due to menometorrhagia/Bleeding  in   left  hip replacement due to Arthropathy    right  hip replacement due to Arthropathy      Social History-- Retired   EtOH: denies   Tobacco: denies   Drug Use: denies       FAMILY HISTORY:  No pertinent family history in first degree relatives      Allergies    adhesives (Rash)  contrast media (iodine-based) (Rash)  penicillin (Short breath; Other)    Intolerances        Home/ Out patient  Medications :  Home Medications:  Ambien 10 mg oral tablet: 1 tab(s) orally once a day (at bedtime) (2019 21:48)  aspirin 81 mg oral tablet: 1 tab(s) orally once a day (2019 21:48)  atenolol 50 mg oral tablet: orally once a day (at bedtime) (2019 21:48)  Dulera 5 mcg-100 mcg/inh inhalation aerosol: 2 puff(s) inhaled once a day (2019 21:48)  Lasix 40 mg oral tablet: 1 tab(s) orally 2 times a day (2019 21:48)  levothyroxine 75 mcg (0.075 mg) oral capsule: 1 cap(s) orally once a day (2019 21:48)  NovoLOG 100 units/mL injectable solution: injectable 2 times a day (before meals) sliding scale coverage as needed (2019 21:48)  spironolactone 25 mg oral tablet: 1 tab(s) orally 2 times a day (2019 21:48)  Tresiba FlexTouch 100 units/mL subcutaneous solution: 76 unit(s) subcutaneous once a day (2019 21:48)  Trulicity Pen 0.75 mg/0.5 mL subcutaneous solution: subcutaneous once a week (2019 21:48)  warfarin 6 mg oral tablet: 1 tab(s) orally once a day (2019 21:48)      Current Inpatient Medications :    ANTIBIOTICS:   aztreonam  IVPB 1000 milliGRAM(s) IV Intermittent every 8 hours  vancomycin  IVPB 1000 milliGRAM(s) IV Intermittent every 12 hours      OTHER RELEVANT MEDICATIONS :  acetaminophen   Tablet .. 650 milliGRAM(s) Oral every 6 hours PRN  allopurinol 100 milliGRAM(s) Oral two times a day  aspirin enteric coated 81 milliGRAM(s) Oral daily  ATENolol  Tablet 50 milliGRAM(s) Oral at bedtime  buDESOnide 160 MICROgram(s)/formoterol 4.5 MICROgram(s) Inhaler 2 Puff(s) Inhalation two times a day  colchicine 0.6 milliGRAM(s) Oral two times a day  dextrose 40% Gel 15 Gram(s) Oral once PRN  dextrose 5%. 1000 milliLiter(s) IV Continuous <Continuous>  dextrose 50% Injectable 12.5 Gram(s) IV Push once  dextrose 50% Injectable 25 Gram(s) IV Push once  dextrose 50% Injectable 25 Gram(s) IV Push once  furosemide    Tablet 40 milliGRAM(s) Oral two times a day  glucagon  Injectable 1 milliGRAM(s) IntraMuscular once PRN  insulin glargine Injectable (LANTUS) 60 Unit(s) SubCutaneous every morning  insulin lispro (HumaLOG) corrective regimen sliding scale   SubCutaneous three times a day before meals  insulin lispro (HumaLOG) corrective regimen sliding scale   SubCutaneous at bedtime  insulin lispro Injectable (HumaLOG) 5 Unit(s) SubCutaneous three times a day before meals  levothyroxine 75 MICROGram(s) Oral daily  spironolactone 25 milliGRAM(s) Oral two times a day  warfarin 6 milliGRAM(s) Oral once  zolpidem 5 milliGRAM(s) Oral at bedtime PRN  zolpidem 5 milliGRAM(s) Oral at bedtime PRN        ROS:  CONSTITUTIONAL:  Negative fever or chills, feels well, good appetite  EYES:  Negative  blurry vision or double vision  CARDIOVASCULAR:  Negative for chest pain or palpitations  RESPIRATORY:  Negative for cough, wheezing, or SOB   GASTROINTESTINAL:  Negative for nausea, vomiting, diarrhea, constipation, or abdominal pain  GENITOURINARY:  Negative frequency, urgency , dysuria or hematuria   NEUROLOGIC:  No headache, confusion, dizziness, lightheadedness  All other systems were reviewed and are negative          I&O's Detail      Physical Exam:  Vital Signs Last 24 Hrs  T(C): 36.6 (10 Jul 2019 15:36), Max: 36.9 (2019 21:25)  T(F): 97.9 (10 Jul 2019 15:36), Max: 98.4 (2019 21:25)  HR: 76 (10 Jul 2019 15:36) (65 - 78)  BP: 118/70 (10 Jul 2019 15:36) (100/51 - 121/42)  BP(mean): --  RR: 18 (10 Jul 2019 15:36) (17 - 19)  SpO2: 96% (10 Jul 2019 15:36) (95% - 97%)      General: Morbidly obese female  in no acute distress  Eyes: sclera anicteric, pupils equal and reactive to light  ENMT: buccal mucosa moist, pharynx not injected  Neck: supple, trachea midline  Lungs: clear, no wheeze/rhonchi  Cardiovascular: regular rate and rhythm, S1 S2  Abdomen: soft, nontender, no organomegaly present, bowel sounds normal  Neurological:  alert and oriented x3, Cranial Nerves II-XII grossly intact  Skin: no increased ecchymosis/petechiae/purpura  Lymph Nodes: no palpable cervical/supraclavicular lymph nodes enlargements  Extremities: no cyanosis/clubbing, has 2 + leg edema L>R leg , multiple varicose veins with venous dermatitis, no erythema , very shiny skin , no warmth , ankle swelling ++ . Distal pulses palpable .     Labs:                             14.4   9.18   )----------(  265       ( 10 Jul 2019 05:51 )               43.7      137    |  104    |  39     ----------------------------<  115        ( 10 Jul 2019 05:51 )  4.3     |  32     |  1.73     Ca    8.6        ( 10 Jul 2019 05:51 )  Phos  2.8       ( 10 Jul 2019 05:51 )  Mg     1.9       ( 10 Jul 2019 05:51 )        PT/INR -  26.1 sec / 2.33 ratio   ( 10 Jul 2019 05:51 )     Procalcitonin, Serum (07.10.19 @ 12:03)    Procalcitonin, Serum: 0.15:       CAPILLARY BLOOD GLUCOSE      POCT Blood Glucose.: 219 mg/dL (10 Jul 2019 17:04)  POCT Blood Glucose.: 177 mg/dL (10 Jul 2019 12:15)  POCT Blood Glucose.: 103 mg/dL (10 Jul 2019 07:47)  POCT Blood Glucose.: 255 mg/dL (2019 22:27)  POCT Blood Glucose.: 115 mg/dL (2019 20:03)    Hemoglobin A1C, Whole Blood (07.10.19 @ 11:48)    Hemoglobin A1C, Whole Blood: 9.3:           Urinalysis Basic - ( 2019 21:37 )    Color: Yellow / Appearance: Slightly Turbid / S.015 / pH: x  Gluc: x / Ketone: Negative  / Bili: Negative / Urobili: Negative mg/dL   Blood: x / Protein: 15 mg/dL / Nitrite: Positive   Leuk Esterase: Moderate / RBC: 0-2 /HPF / WBC 26-50   Sq Epi: x / Non Sq Epi: Few / Bacteria: Moderate            RECENT CULTURES:          RADIOLOGY & ADDITIONAL STUDIES:  US Renal (07.10.19 @ 17:54) >    FINDINGS:    Right kidney:  10.6 cm. No renal mass, hydronephrosis or calculi. No   perirenal collections.    Left kidney: 12.4 cm. No renal mass, hydronephrosis or calculi. No   perirenal collections    Urinary bladder: Empty.    IMPRESSION:     No hydronephrotic changes.       Xray Tibia + Fibula 2 Views, Left (19 @ 19:18) >  FINDINGS:  Patient is S/P left knee  replacement prosthesis. The orthopedic   components are in good position and alignment.  No fracture or subluxation.  No bony erosive or destructive lesions,  Age-appropriate osteopenia.  Nonspecific soft tissue swelling/edema.. No tracking of soft tissue air.    IMPRESSION:  1.  No fracture-subluxation demonstrated.  2. Soft tissue changes as above.    Xray Ankle Complete 3 Views, Left (19 @ 19:17) >  FINDINGS:  Age-appropriate diffuse osteopenia.  No clearcut acute left ankle fracture is seen. No ankle subluxation.   Moderate underlying left ankle osteoarthritis. There is medial and   lateral joint space narrowing and periarticular spurring.  The ankle mortise and talar dome are maintained.  No discrete bony erosive or destructive lesions.  Moderate calcaneal spurring most pronounced in the plantar region.  Moderate soft tissue swelling most pronounced laterally - cellulitis not   excluded. No tracking of soft tissue air  Moderate vascular calcifications.    IMPRESSION:   1. No fracture-subluxation demonstrated.  2. Degenerative disease.  3. Soft tissue swelling - ?cellulitis-related.    Assessment :     79 year old female with multiple medical problems admitted with increased swelling left leg with ankle pain likely has venous insufficiency with varicose veins related dermatitis and dry skin. Clinically no sign of cellulitis . She may have OA or gout flare up . In absence of fever or leukocytosis doubt she has a skin infection but cannot be completely ruled out . She has CKD so need ot be careful with Vancomycin     Plan :   - will DC Azactam , no need for gram negative coverage   - continue with vancomycin cautiously , get MRSA screen  and vanco trough before 4th dose   - if stays stable and cs negative change to po doxycycline 100 mg bid x 7 days   - needs venous compression stockings   - skin moisturizer  - continue with treatment of gout    - need better control of DM< may have high BG secondary to recent use of steroids     Continue with present regime .  Appropriate use of antibiotics and adverse effects reviewed.      I have discussed the above plan of care with patient and her daughter in detail. They expressed understanding of the treatment plan . Risks, benefits and alternatives discussed in detail. I have asked if they have any questions or concerns and appropriately addressed them to the best of my ability .      > 45 minutes spent in direct patient care reviewing  the notes, lab data/ imaging , discussion with multidisciplinary team. All questions were addressed and answered to the best of my capacity .    Thank you for allowing me to participate in the care of your patient .      Leticia Jacobson MD  [phone]

## 2019-07-10 NOTE — H&P ADULT - NSICDXPASTMEDICALHX_GEN_ALL_CORE_FT
PAST MEDICAL HISTORY:  2006  left hip Arthropathy     Atrial Fibrillation     COPD (Chronic Obstructive Pulmonary Disease)     Diabetes x 15 years----on insulin 5 years     h/o  due menometorrhagia/Bleeding     Hypothyroid after she received radioactive iodine for hyperthyroidism     in 2004, right  hip Arthropathy and in 2006 right hip replacement     in 2011    left knee Arthropathy     Obesity     right knee Arthropathy     Sleep Apnea--uses device at night

## 2019-07-11 ENCOUNTER — TRANSCRIPTION ENCOUNTER (OUTPATIENT)
Age: 79
End: 2019-07-11

## 2019-07-11 VITALS
RESPIRATION RATE: 16 BRPM | SYSTOLIC BLOOD PRESSURE: 108 MMHG | TEMPERATURE: 98 F | HEART RATE: 70 BPM | OXYGEN SATURATION: 98 % | DIASTOLIC BLOOD PRESSURE: 69 MMHG

## 2019-07-11 LAB
ANION GAP SERPL CALC-SCNC: 4 MMOL/L — LOW (ref 5–17)
BASOPHILS # BLD AUTO: 0.04 K/UL — SIGNIFICANT CHANGE UP (ref 0–0.2)
BASOPHILS NFR BLD AUTO: 0.5 % — SIGNIFICANT CHANGE UP (ref 0–2)
BUN SERPL-MCNC: 35 MG/DL — HIGH (ref 7–23)
CALCIUM SERPL-MCNC: 8.9 MG/DL — SIGNIFICANT CHANGE UP (ref 8.4–10.5)
CHLORIDE SERPL-SCNC: 103 MMOL/L — SIGNIFICANT CHANGE UP (ref 96–108)
CO2 SERPL-SCNC: 30 MMOL/L — SIGNIFICANT CHANGE UP (ref 22–31)
CREAT SERPL-MCNC: 1.59 MG/DL — HIGH (ref 0.5–1.3)
CRP SERPL-MCNC: 2.93 MG/DL — HIGH (ref 0–0.4)
CULTURE RESULTS: SIGNIFICANT CHANGE UP
EOSINOPHIL # BLD AUTO: 0.21 K/UL — SIGNIFICANT CHANGE UP (ref 0–0.5)
EOSINOPHIL NFR BLD AUTO: 2.5 % — SIGNIFICANT CHANGE UP (ref 0–6)
ERYTHROCYTE [SEDIMENTATION RATE] IN BLOOD: 36 MM/HR — HIGH (ref 0–20)
GLUCOSE BLDC GLUCOMTR-MCNC: 121 MG/DL — HIGH (ref 70–99)
GLUCOSE BLDC GLUCOMTR-MCNC: 214 MG/DL — HIGH (ref 70–99)
GLUCOSE BLDC GLUCOMTR-MCNC: 215 MG/DL — HIGH (ref 70–99)
GLUCOSE SERPL-MCNC: 112 MG/DL — HIGH (ref 70–99)
HCT VFR BLD CALC: 44.9 % — SIGNIFICANT CHANGE UP (ref 34.5–45)
HGB BLD-MCNC: 14.6 G/DL — SIGNIFICANT CHANGE UP (ref 11.5–15.5)
IMM GRANULOCYTES NFR BLD AUTO: 0.7 % — SIGNIFICANT CHANGE UP (ref 0–1.5)
INR BLD: 2.58 RATIO — HIGH (ref 0.88–1.16)
LYMPHOCYTES # BLD AUTO: 1.26 K/UL — SIGNIFICANT CHANGE UP (ref 1–3.3)
LYMPHOCYTES # BLD AUTO: 15 % — SIGNIFICANT CHANGE UP (ref 13–44)
MCHC RBC-ENTMCNC: 31.6 PG — SIGNIFICANT CHANGE UP (ref 27–34)
MCHC RBC-ENTMCNC: 32.5 GM/DL — SIGNIFICANT CHANGE UP (ref 32–36)
MCV RBC AUTO: 97.2 FL — SIGNIFICANT CHANGE UP (ref 80–100)
MONOCYTES # BLD AUTO: 1.17 K/UL — HIGH (ref 0–0.9)
MONOCYTES NFR BLD AUTO: 13.9 % — SIGNIFICANT CHANGE UP (ref 2–14)
NEUTROPHILS # BLD AUTO: 5.68 K/UL — SIGNIFICANT CHANGE UP (ref 1.8–7.4)
NEUTROPHILS NFR BLD AUTO: 67.4 % — SIGNIFICANT CHANGE UP (ref 43–77)
NRBC # BLD: 0 /100 WBCS — SIGNIFICANT CHANGE UP (ref 0–0)
PLATELET # BLD AUTO: 279 K/UL — SIGNIFICANT CHANGE UP (ref 150–400)
POTASSIUM SERPL-MCNC: 4.5 MMOL/L — SIGNIFICANT CHANGE UP (ref 3.5–5.3)
POTASSIUM SERPL-SCNC: 4.5 MMOL/L — SIGNIFICANT CHANGE UP (ref 3.5–5.3)
PROTHROM AB SERPL-ACNC: 29 SEC — HIGH (ref 10–12.9)
RBC # BLD: 4.62 M/UL — SIGNIFICANT CHANGE UP (ref 3.8–5.2)
RBC # FLD: 14.8 % — HIGH (ref 10.3–14.5)
SODIUM SERPL-SCNC: 137 MMOL/L — SIGNIFICANT CHANGE UP (ref 135–145)
SPECIMEN SOURCE: SIGNIFICANT CHANGE UP
VANCOMYCIN TROUGH SERPL-MCNC: 18.4 UG/ML — SIGNIFICANT CHANGE UP (ref 10–20)
WBC # BLD: 8.42 K/UL — SIGNIFICANT CHANGE UP (ref 3.8–10.5)
WBC # FLD AUTO: 8.42 K/UL — SIGNIFICANT CHANGE UP (ref 3.8–10.5)

## 2019-07-11 PROCEDURE — 87640 STAPH A DNA AMP PROBE: CPT

## 2019-07-11 PROCEDURE — 85652 RBC SED RATE AUTOMATED: CPT

## 2019-07-11 PROCEDURE — 85730 THROMBOPLASTIN TIME PARTIAL: CPT

## 2019-07-11 PROCEDURE — 93971 EXTREMITY STUDY: CPT

## 2019-07-11 PROCEDURE — 82962 GLUCOSE BLOOD TEST: CPT

## 2019-07-11 PROCEDURE — 86140 C-REACTIVE PROTEIN: CPT

## 2019-07-11 PROCEDURE — 99285 EMERGENCY DEPT VISIT HI MDM: CPT | Mod: 25

## 2019-07-11 PROCEDURE — 84145 PROCALCITONIN (PCT): CPT

## 2019-07-11 PROCEDURE — 80048 BASIC METABOLIC PNL TOTAL CA: CPT

## 2019-07-11 PROCEDURE — 80061 LIPID PANEL: CPT

## 2019-07-11 PROCEDURE — 87641 MR-STAPH DNA AMP PROBE: CPT

## 2019-07-11 PROCEDURE — 85610 PROTHROMBIN TIME: CPT

## 2019-07-11 PROCEDURE — 87040 BLOOD CULTURE FOR BACTERIA: CPT

## 2019-07-11 PROCEDURE — 80202 ASSAY OF VANCOMYCIN: CPT

## 2019-07-11 PROCEDURE — 84100 ASSAY OF PHOSPHORUS: CPT

## 2019-07-11 PROCEDURE — 85027 COMPLETE CBC AUTOMATED: CPT

## 2019-07-11 PROCEDURE — 87086 URINE CULTURE/COLONY COUNT: CPT

## 2019-07-11 PROCEDURE — 80053 COMPREHEN METABOLIC PANEL: CPT

## 2019-07-11 PROCEDURE — 83735 ASSAY OF MAGNESIUM: CPT

## 2019-07-11 PROCEDURE — 73610 X-RAY EXAM OF ANKLE: CPT

## 2019-07-11 PROCEDURE — 96365 THER/PROPH/DIAG IV INF INIT: CPT

## 2019-07-11 PROCEDURE — 76775 US EXAM ABDO BACK WALL LIM: CPT

## 2019-07-11 PROCEDURE — 73590 X-RAY EXAM OF LOWER LEG: CPT

## 2019-07-11 PROCEDURE — 83605 ASSAY OF LACTIC ACID: CPT

## 2019-07-11 PROCEDURE — 71045 X-RAY EXAM CHEST 1 VIEW: CPT

## 2019-07-11 PROCEDURE — 81001 URINALYSIS AUTO W/SCOPE: CPT

## 2019-07-11 PROCEDURE — 36415 COLL VENOUS BLD VENIPUNCTURE: CPT

## 2019-07-11 PROCEDURE — 84550 ASSAY OF BLOOD/URIC ACID: CPT

## 2019-07-11 PROCEDURE — 83036 HEMOGLOBIN GLYCOSYLATED A1C: CPT

## 2019-07-11 PROCEDURE — 93005 ELECTROCARDIOGRAM TRACING: CPT

## 2019-07-11 RX ORDER — LACTOBACILLUS ACIDOPHILUS 100MM CELL
1 CAPSULE ORAL
Qty: 0 | Refills: 0 | DISCHARGE
Start: 2019-07-11

## 2019-07-11 RX ORDER — SOD,AMMONIUM,POTASSIUM LACTATE
1 CREAM (GRAM) TOPICAL
Qty: 1 | Refills: 0
Start: 2019-07-11 | End: 2019-08-09

## 2019-07-11 RX ORDER — ACETAMINOPHEN 500 MG
2 TABLET ORAL
Qty: 0 | Refills: 0 | DISCHARGE
Start: 2019-07-11

## 2019-07-11 RX ORDER — ALLOPURINOL 300 MG
1 TABLET ORAL
Qty: 60 | Refills: 0
Start: 2019-07-11 | End: 2019-08-09

## 2019-07-11 RX ADMIN — Medication 100 MILLIGRAM(S): at 06:31

## 2019-07-11 RX ADMIN — Medication 75 MICROGRAM(S): at 05:59

## 2019-07-11 RX ADMIN — SPIRONOLACTONE 25 MILLIGRAM(S): 25 TABLET, FILM COATED ORAL at 06:31

## 2019-07-11 RX ADMIN — Medication 250 MILLIGRAM(S): at 08:48

## 2019-07-11 RX ADMIN — Medication 40 MILLIGRAM(S): at 06:31

## 2019-07-11 RX ADMIN — Medication 4: at 12:48

## 2019-07-11 RX ADMIN — Medication 1 APPLICATION(S): at 08:46

## 2019-07-11 RX ADMIN — Medication 81 MILLIGRAM(S): at 11:22

## 2019-07-11 RX ADMIN — Medication 5 UNIT(S): at 12:49

## 2019-07-11 RX ADMIN — Medication 1 TABLET(S): at 08:46

## 2019-07-11 RX ADMIN — Medication 1 TABLET(S): at 12:49

## 2019-07-11 NOTE — DISCHARGE NOTE NURSING/CASE MANAGEMENT/SOCIAL WORK - NSDCDPATPORTLINK_GEN_ALL_CORE
You can access the [facility] Portal, offered by [facility], by registering with the following website: [email or web address]

## 2019-07-11 NOTE — PROVIDER CONTACT NOTE (MEDICATION) - SITUATION
Concerned regarding  patient self administer her own insulin  without communicating fact and now  refuses to transition to hospital regime

## 2019-07-11 NOTE — DISCHARGE NOTE PROVIDER - HOSPITAL COURSE
Patient admitted for left leg swelling and redness.     She was ruled out for DVT.     Patient admitted for cellulitis of left leg.     Treated with IV antibiotics.     Patient was ruled out for sepsis.     Patient improved and is being discharged home.    Patient also has uncontrolled diabetes mellitus, CKD 3 (possibly hypertensive kidney disease and diabetic kidney disease).     Patient also has gout with possible acute exacerbation, POA.     Patient is advised follow up with PMD, Cardiology, Pulmonary and Rheumatology as out patient.

## 2019-07-11 NOTE — DISCHARGE NOTE PROVIDER - CARE PROVIDER_API CALL
Momo Gonzalez)  Critical Care Medicine; Internal Medicine; Pulmonary Disease  [address] [place] [address]  Phone: [phone]  Fax: [phone]  Follow Up Time: 2 weeks

## 2019-07-11 NOTE — ADVANCED PRACTICE NURSE CONSULT - ASSESSMENT
Patient is a 79y old  Female who presents with a chief complaint of Leg swelling and redness. (10 Jul 2019 19:13)      ROS:  Cardiovascular: No chest Pain, palpitations  Respiratory No SOB, No cough  GI: No nausea,Vomiting,abdominal pain  Endocrine: no polyuria,polydipsia    Vital Signs Last 24 Hrs  T(F): 97.9 (11 Jul 2019 05:23), Max: 98 (10 Jul 2019 21:55)  HR: 75 (11 Jul 2019 05:23) (60 - 76)  BP: 117/73 (11 Jul 2019 05:23) (99/62 - 119/74)  RR: 15 (11 Jul 2019 05:23) (15 - 18)  SpO2: 97% (11 Jul 2019 05:23) (96% - 97%)    Physical Exam:  General:NAD  Cardiovascular Regular rate and rhythm  REspiratory: easy and unlabored  Psych: Alert and oriented x3 normal affect normal mood    PAST MEDICAL & SURGICAL HISTORY:  Diabetes Type 2  Obesity  COPD (Chronic Obstructive Pulmonary Disease)  Sleep Apnea--uses device at night  Hypothyroid after she received radioactive iodine for hyperthyroidism  2006  left hip Arthropathy  Atrial Fibrillation  Right Hip surgery  right knee Arthropathy    left knee Arthropathy  b/l  eye surgery for ? Ocular Hypertension  S/P Cholecystectomy    adhesives (Rash)  contrast media (iodine-based) (Rash)  penicillin (Short breath; Other)      MEDICATIONS  (STANDING):  allopurinol 100 milliGRAM(s) Oral two times a day  ammonium lactate 12% Lotion 1 Application(s) Topical two times a day  aspirin enteric coated 81 milliGRAM(s) Oral daily  ATENolol  Tablet 50 milliGRAM(s) Oral at bedtime  buDESOnide 160 MICROgram(s)/formoterol 4.5 MICROgram(s) Inhaler 2 Puff(s) Inhalation two times a day  colchicine 0.6 milliGRAM(s) Oral two times a day  dextrose 5%. 1000 milliLiter(s) (50 mL/Hr) IV Continuous <Continuous>  dextrose 50% Injectable 12.5 Gram(s) IV Push once  dextrose 50% Injectable 25 Gram(s) IV Push once  dextrose 50% Injectable 25 Gram(s) IV Push once  furosemide    Tablet 40 milliGRAM(s) Oral two times a day  insulin glargine Injectable (LANTUS) 60 Unit(s) SubCutaneous every morning  insulin lispro (HumaLOG) corrective regimen sliding scale   SubCutaneous three times a day before meals  insulin lispro (HumaLOG) corrective regimen sliding scale   SubCutaneous at bedtime  insulin lispro Injectable (HumaLOG) 5 Unit(s) SubCutaneous three times a day before meals  lactobacillus acidophilus 1 Tablet(s) Oral three times a day with meals  levothyroxine 75 MICROGram(s) Oral daily  spironolactone 25 milliGRAM(s) Oral two times a day  vancomycin  IVPB 1000 milliGRAM(s) IV Intermittent every 12 hours      DM Home Medications:  NovoLOG 100 units/mL injectable solution: injectable 2 times a day (before meals) sliding scale coverage as needed (09 Jul 2019 21:48)  Tresiba FlexTouch 100 units/mL subcutaneous solution: 76 unit(s) subcutaneous once a day   Trulicity Pen 0.75 mg/0.5 mL subcutaneous solution: subcutaneous once a week       Diet: Consistant Carbohydrates    A1c:9.3% stopped steroids MOndau 7/8 07-11    137  |  103  |  35<H>  ----------------------------<  112<H>  4.5   |  30  |  1.59<H>      POCT Blood Glucose.: 121 mg/dL (11 Jul 2019 08:27)  POCT Blood Glucose.: 214 mg/dL (11 Jul 2019 01:12)  POCT Blood Glucose.: 143 mg/dL (10 Jul 2019 21:35)  POCT Blood Glucose.: 219 mg/dL (10 Jul 2019 17:04)  POCT Blood Glucose.: 177 mg/dL (10 Jul 2019 12:15)  POCT Blood Glucose.: 103 mg/dL (10 Jul 2019 07:47)  POCT Blood Glucose.: 255 mg/dL (09 Jul 2019 22:27)  POCT Blood Glucose.: 115 mg/dL (09 Jul 2019 20:03)

## 2019-07-11 NOTE — PROGRESS NOTE ADULT - ASSESSMENT
1.	TALITA, CKD 3  2.	Diabetes  3.	Cellulitis  4.	Hypertension    Improving renal indices. Monitor blood sugar levels. Insulin coverage as needed. Dietary restriction.   Monitor BP trend. Titrate BP meds as needed. Salt restriction. Abx, ID follow up.   Will follow electrolytes and renal function trend.
79 years old female with past medical history of COPD, DM, Atrial Fibrillation, hypothyroidism, gout, chronic leg edema, OA, s/p left knee replacement, who has been having increasing pain her left ankle. Patient  was thought to be having gout flair up. She was being treated with medications. Patient had increasing swelling and redness of left leg. She was seen by rheumatology and was sent in to ER for possible cellulitis. Patient was admitted for IV antibiotics as per ER physician.

## 2019-07-11 NOTE — PROGRESS NOTE ADULT - SUBJECTIVE AND OBJECTIVE BOX
ID progress note    Name: CHELSEA SWEET  Age: 79y  Gender: Female  MRN: [id number]    Interval History-- Events noted, feels better . wants to  go home, afebrile .   Notes reviewed    Past Medical History--  Cataract--bilateral  Diabetes x 15 years----on insulin 5 years  Obesity  COPD (Chronic Obstructive Pulmonary Disease)  Sleep Apnea--uses device at night  h/o  due menometorrhagia/Bleeding  Hypothyroid after she received radioactive iodine for hyperthyroidism    left hip Arthropathy  Atrial Fibrillation  in , right  hip Arthropathy and in  right hip replacement  right knee Arthropathy  in     left knee Arthropathy  Status post cataract extraction  b/l  eye surgery for ? Ocular Hypertension  S/P Cholecystectomy  S/P Cholecystectomy  S/P Tonsillectomy   Delivery ,  & 1975  hysterectomy due to menometorrhagia/Bleeding  in   left  hip replacement due to Arthropathy    right  hip replacement due to Arthropathy      For details regarding the patient's social history, family history, and other miscellaneous elements, please refer the initial infectious diseases consultation and/or the admitting history and physical examination for this admission.    Allergies--  Allergies    adhesives (Rash)  contrast media (iodine-based) (Rash)  penicillin (Short breath; Other)    Intolerances        Medications--  Antibiotics:  vancomycin  IVPB 1000 milliGRAM(s) IV Intermittent every 12 hours    Immunologic:    Other:  acetaminophen   Tablet .. PRN  allopurinol  ammonium lactate 12% Lotion  aspirin enteric coated  ATENolol  Tablet  buDESOnide 160 MICROgram(s)/formoterol 4.5 MICROgram(s) Inhaler  colchicine  dextrose 40% Gel PRN  dextrose 5%.  dextrose 50% Injectable  dextrose 50% Injectable  dextrose 50% Injectable  furosemide    Tablet  glucagon  Injectable PRN  insulin glargine Injectable (LANTUS)  insulin lispro (HumaLOG) corrective regimen sliding scale  insulin lispro (HumaLOG) corrective regimen sliding scale  insulin lispro Injectable (HumaLOG)  lactobacillus acidophilus  levothyroxine  spironolactone  zolpidem PRN  zolpidem PRN      Review of Systems--  A 10-point review of systems was obtained.     Pertinent positives and negatives--  Constitutional: No fevers. No Chills. No Rigors.   Cardiovascular: No chest pain. No palpitations.  Respiratory: No shortness of breath. No cough.  Gastrointestinal: No nausea or vomiting. No diarrhea or constipation.   Psychiatric: Pleasant. Appropriate affect.    Review of systems otherwise negative except as previously noted.    Physical Examination--  Vital Signs: T(F): 97.5 (19 @ 09:29), Max: 98 (07-10-19 @ 21:55)  HR: 71 (19 @ 09:29)  BP: 102/63 (19 @ 09:29)  RR: 16 (19 @ 09:29)  SpO2: 98% (19 @ 09:29)  Wt(kg): --  General: Nontoxic-appearing Female in no acute distress.  HEENT: AT/NC. PERRL. EOMI. Anicteric. Conjunctiva pink and moist. Oropharynx clear. Dentition fair.  Neck: Not rigid. No sense of mass.  Nodes: None palpable.  Lungs: Clear bilaterally without rales, wheezing or rhonchi  Heart: Regular rate and rhythm. No Murmur. No rub. No gallop. No palpable thrill.  Abdomen: Bowel sounds present and normoactive. Soft. Nondistended. Nontender. No sense of mass. No organomegaly.  Back: No spinal tenderness. No costovertebral angle tenderness.   Extremities: No cyanosis or clubbing. has 2 + leg edema L>R leg , multiple varicose veins with venous dermatitis, no erythema , very shiny skin , no warmth , ankle swelling ++ . Distal pulses palpable .   Skin: Warm. Dry. Good turgor. No rash. No vasculitic stigmata.  Psychiatric: Appropriate affect and mood for situation.         Laboratory Studies--  CBC                        14.6   8.42  )-----------( 279      ( 2019 07:51 )             44.9       Chemistries      137  |  103  |  35<H>  ----------------------------<  112<H>  4.5   |  30  |  1.59<H>    Ca    8.9      2019 07:51  Phos  2.8     07-10  Mg     1.9     07-10    TPro  7.5  /  Alb  3.0<L>  /  TBili  0.7  /  DBili  x   /  AST  70<H>  /  ALT  70<H>  /  AlkPhos  177<H>  07-09    CAPILLARY BLOOD GLUCOSE  POCT Blood Glucose.: 121 mg/dL (2019 08:27)  POCT Blood Glucose.: 214 mg/dL (2019 01:12)  POCT Blood Glucose.: 143 mg/dL (10 Jul 2019 21:35)  POCT Blood Glucose.: 219 mg/dL (10 Jul 2019 17:04)  POCT Blood Glucose.: 177 mg/dL (10 Jul 2019 12:15)    Hemoglobin A1C, Whole Blood (07.10.19 @ 11:48)    Hemoglobin A1C, Whole Blood: 9.3:     Sedimentation Rate, Erythrocyte (19 @ 07:51)    Sedimentation Rate, Erythrocyte: 36 mm/Hr    C-Reactive Protein, Serum (19 @ 11:12)    C-Reactive Protein, Serum: 2.93 mg/dL     Procalcitonin, Serum (07.10.19 @ 12:03)    Procalcitonin, Serum: 0.15:     Vancomycin Level, Trough (19 @ 07:51)    Vancomycin Level, Trough: 18.4:     Culture Data    Culture - Blood (collected 2019 22:41)  Source: .Blood  Preliminary Report (10 Jul 2019 23:01):    No growth to date.    Culture - Blood (collected 2019 22:41)  Source: .Blood  Preliminary Report (10 Jul 2019 23:01):    No growth to date.          RADIOLOGY:    US Renal (07.10.19 @ 17:54) >    FINDINGS:    Right kidney:  10.6 cm. No renal mass, hydronephrosis or calculi. No   perirenal collections.    Left kidney: 12.4 cm. No renal mass, hydronephrosis or calculi. No   perirenal collections    Urinary bladder: Empty.    IMPRESSION:     No hydronephrotic changes.       Xray Tibia + Fibula 2 Views, Left (19 @ 19:18) >  FINDINGS:  Patient is S/P left knee  replacement prosthesis. The orthopedic   components are in good position and alignment.  No fracture or subluxation.  No bony erosive or destructive lesions,  Age-appropriate osteopenia.  Nonspecific soft tissue swelling/edema.. No tracking of soft tissue air.    IMPRESSION:  1.  No fracture-subluxation demonstrated.  2. Soft tissue changes as above.    Xray Ankle Complete 3 Views, Left (19 @ 19:17) >  FINDINGS:  Age-appropriate diffuse osteopenia.  No clearcut acute left ankle fracture is seen. No ankle subluxation.   Moderate underlying left ankle osteoarthritis. There is medial and   lateral joint space narrowing and periarticular spurring.  The ankle mortise and talar dome are maintained.  No discrete bony erosive or destructive lesions.  Moderate calcaneal spurring most pronounced in the plantar region.  Moderate soft tissue swelling most pronounced laterally - cellulitis not   excluded. No tracking of soft tissue air  Moderate vascular calcifications.    IMPRESSION:   1. No fracture-subluxation demonstrated.  2. Degenerative disease.  3. Soft tissue swelling - ?cellulitis-related.    Assessment :     79 year old female with multiple medical problems admitted with increased swelling left leg with ankle pain likely has venous insufficiency with varicose veins related dermatitis and dry skin. Clinically no sign of cellulitis . She may have OA or gout flare up . In absence of fever or leukocytosis doubt she has a skin infection but cannot be completely ruled out . She has CKD so need ot be careful with Vancomycin     Plan :   - Will change to po doxycycline 100 mg bid x 7 days as all cs negative   - needs venous compression stockings   - skin moisturizer  - continue with treatment of gout    - need better control of DM< may have high BG secondary to recent use of steroids     Continue with present regime .  Appropriate use of antibiotics and adverse effects reviewed.      I have discussed the above plan of care with patient and family in detail. They expressed understanding of the treatment plan . Risks, benefits and alternatives discussed in detail. I have asked if they have any questions or concerns and appropriately addressed them to the best of my ability .      > 25 minutes spent in direct patient care reviewing  the notes, lab data/ imaging , discussion with multidisciplinary team. All questions were addressed and answered to the best of my capacity .    Thank you for allowing me to participate in the care of your patient .        Leticia Jacobson MD  [phone]

## 2019-07-11 NOTE — PROVIDER CONTACT NOTE (MEDICATION) - RECOMMENDATIONS
Send insulin to pharmacy  transition to  hospital insulin  Communicate to Nursing administration and  Pharmacy  Dr Vela to speak with  patient regarding issue today for possible  discharge  Monitor glucose trends

## 2019-07-11 NOTE — DISCHARGE NOTE PROVIDER - NSDCCPCAREPLAN_GEN_ALL_CORE_FT
PRINCIPAL DISCHARGE DIAGNOSIS  Diagnosis: Cellulitis of left lower extremity  Assessment and Plan of Treatment: Complete course of antibiotics as prescribed.   Increase activity as tolerated.   Follow up with PMD as out patient .   Follow up with Dr. Gonzalez in 1-2 weeks.      SECONDARY DISCHARGE DIAGNOSES  Diagnosis: Type 2 diabetes mellitus with other specified complication, without long-term current use of insulin  Assessment and Plan of Treatment: Type 2 diabetes mellitus with other specified complication, without long-term current use of insulin    Diagnosis: Stage 3 chronic kidney disease  Assessment and Plan of Treatment: Stage 3 chronic kidney disease    Diagnosis: Hypothyroidism, unspecified type  Assessment and Plan of Treatment: Hypothyroidism, unspecified type    Diagnosis: Chronic obstructive pulmonary disease, unspecified COPD type  Assessment and Plan of Treatment: Chronic obstructive pulmonary disease, unspecified COPD type

## 2019-07-11 NOTE — DISCHARGE NOTE PROVIDER - NSDCACTIVITY_GEN_ALL_CORE
Driving allowed/Walking - Indoors allowed/Stairs allowed/Do not drive or operate machinery/Bathing allowed/Walking - Outdoors allowed/Showering allowed

## 2019-07-11 NOTE — ADVANCED PRACTICE NURSE CONSULT - RECOMMEDATIONS
Monitor glucose trends  Notify Dr Vela of patient self administer own insulin  Notify  Nursing Administration and  Pharmacy  Goal range 100 to 180mg/dl  follow prn

## 2019-07-11 NOTE — PROVIDER CONTACT NOTE (MEDICATION) - BACKGROUND
Patient has been taking tresiba 75units sq daily am with insulin pen and novolog prn blood glucose 200mg/dl at this time already took today dose

## 2019-07-11 NOTE — DISCHARGE NOTE NURSING/CASE MANAGEMENT/SOCIAL WORK - NSDCDMETYPESERV_GEN_ALL_CORE_FT
you have your own rolling walker, wheelchair, and home O2 (Inogen) 2 liters nasal canula through [employer] [facility]

## 2019-07-11 NOTE — PROGRESS NOTE ADULT - SUBJECTIVE AND OBJECTIVE BOX
Patient is a 79y old  Female who presents with a chief complaint of Leg swelling and redness. (2019 12:03)      Patient seen in follow up for TALITA, CKD 3.     PAST MEDICAL HISTORY:  Cataract--bilateral  Diabetes x 15 years----on insulin 5 years  Obesity  COPD (Chronic Obstructive Pulmonary Disease)  Sleep Apnea--uses device at night  h/o  due menometorrhagia/Bleeding  Hypothyroid after she received radioactive iodine for hyperthyroidism    left hip Arthropathy  Atrial Fibrillation  in , right  hip Arthropathy and in  right hip replacement  right knee Arthropathy  in     left knee Arthropathy    MEDICATIONS  (STANDING):  allopurinol 100 milliGRAM(s) Oral two times a day  ammonium lactate 12% Lotion 1 Application(s) Topical two times a day  aspirin enteric coated 81 milliGRAM(s) Oral daily  ATENolol  Tablet 50 milliGRAM(s) Oral at bedtime  buDESOnide 160 MICROgram(s)/formoterol 4.5 MICROgram(s) Inhaler 2 Puff(s) Inhalation two times a day  colchicine 0.6 milliGRAM(s) Oral two times a day  dextrose 5%. 1000 milliLiter(s) (50 mL/Hr) IV Continuous <Continuous>  dextrose 50% Injectable 12.5 Gram(s) IV Push once  dextrose 50% Injectable 25 Gram(s) IV Push once  dextrose 50% Injectable 25 Gram(s) IV Push once  doxycycline hyclate Capsule 100 milliGRAM(s) Oral every 12 hours  furosemide    Tablet 40 milliGRAM(s) Oral two times a day  insulin glargine Injectable (LANTUS) 60 Unit(s) SubCutaneous every morning  insulin lispro (HumaLOG) corrective regimen sliding scale   SubCutaneous three times a day before meals  insulin lispro (HumaLOG) corrective regimen sliding scale   SubCutaneous at bedtime  insulin lispro Injectable (HumaLOG) 5 Unit(s) SubCutaneous three times a day before meals  lactobacillus acidophilus 1 Tablet(s) Oral three times a day with meals  levothyroxine 75 MICROGram(s) Oral daily  spironolactone 25 milliGRAM(s) Oral two times a day    MEDICATIONS  (PRN):  acetaminophen   Tablet .. 650 milliGRAM(s) Oral every 6 hours PRN Temp greater or equal to 38.5C (101.3F), Mild Pain (1 - 3)  dextrose 40% Gel 15 Gram(s) Oral once PRN Blood Glucose LESS THAN 70 milliGRAM(s)/deciliter  glucagon  Injectable 1 milliGRAM(s) IntraMuscular once PRN Glucose LESS THAN 70 milligrams/deciliter  zolpidem 5 milliGRAM(s) Oral at bedtime PRN Insomnia  zolpidem 5 milliGRAM(s) Oral at bedtime PRN Insomnia    T(C): 36.4 (19 @ 09:29), Max: 36.8 (07-10-19 @ 04:00)  HR: 71 (19 @ 09:29) (60 - 76)  BP: 102/63 (19 @ 09:29) (99/62 - 119/74)  RR: 16 (19 @ 09:29) (15 - 19)  SpO2: 98% (19 @ 09:29) (95% - 98%)  Wt(kg): --  I&O's Detail      PHYSICAL EXAM:  General: NAD  Respiratory: b/l air entry  Cardiovascular: S1 S2  Gastrointestinal: soft  Extremities:  edema                          14.6   8.42  )-----------( 279      ( 2019 07:51 )             44.9     07-11    137  |  103  |  35<H>  ----------------------------<  112<H>  4.5   |  30  |  1.59<H>    Ca    8.9      2019 07:51  Phos  2.8     07-10  Mg     1.9     07-10    TPro  7.5  /  Alb  3.0<L>  /  TBili  0.7  /  DBili  x   /  AST  70<H>  /  ALT  70<H>  /  AlkPhos  177<H>          LIVER FUNCTIONS - ( 2019 19:35 )  Alb: 3.0 g/dL / Pro: 7.5 g/dL / ALK PHOS: 177 U/L / ALT: 70 U/L DA / AST: 70 U/L / GGT: x           Urinalysis Basic - ( 2019 21:37 )    Color: Yellow / Appearance: Slightly Turbid / S.015 / pH: x  Gluc: x / Ketone: Negative  / Bili: Negative / Urobili: Negative mg/dL   Blood: x / Protein: 15 mg/dL / Nitrite: Positive   Leuk Esterase: Moderate / RBC: 0-2 /HPF / WBC 26-50   Sq Epi: x / Non Sq Epi: Few / Bacteria: Moderate        Sodium, Serum: 137 ( @ 07:51)  Sodium, Serum: 137 (07-10 @ 05:51)  Sodium, Serum: 137 ( @ 19:35)    Creatinine, Serum: 1.59 (:51)  Creatinine, Serum: 1.73 (07-10 @ 05:51)  Creatinine, Serum: 1.90 ( @ 19:35)    Potassium, Serum: 4.5 (:51)  Potassium, Serum: 4.3 (07-10 @ 05:51)  Potassium, Serum: 4.5 ( 19:35)    Hemoglobin: 14.6 ( @ 07:51)  Hemoglobin: 14.4 (07-10 @ 05:51)  Hemoglobin: 15.2 ( 19:35)

## 2019-07-11 NOTE — PROGRESS NOTE ADULT - SUBJECTIVE AND OBJECTIVE BOX
Patient is a 79y old  Female who presents with a chief complaint of Leg swelling and redness. (2019 12:44)    HPI:  79 years old female with past medical history of COPD, DM, Atrial Fibrillation, hypothyroidism, gout, chronic leg edema, OA, s/p left knee replacement, who has been having increasing pain her left ankle. Patient  was thought to be having gout flair up. She was being treated with medications. Patient had increasing swelling and redness of left leg. She was seen by rheumatology and was sent in to ER for possible cellulitis. Patient was admitted for IV antibiotics as per ER physician.     Patient is feeling better today. As per patient, her swelling and redness are better. She still has some pain in her leg.   patient denies any chest pain or pressure.   No nausea or vomiting.   No fever or chills. (10 Jul 2019 12:45)    INTERVAL HPI/OVERNIGHT EVENTS:  Chart reviewed, notes reviewed.   Patient seen and examined.  Being followed by following specialists: ID,     Consultant(s) Notes Reviewed:  [ ] Yes    Care Discussed with Consultants/Other Providers: [ ] Yes    REVIEW OF SYSTEMS:  CONSTITUTIONAL: No fever, weight loss, or fatigue  EYES: No eye pain, visual disturbances, or discharge  ENMT:  No difficulty hearing, tinnitus, vertigo; No sinus or throat pain  NECK: No pain or stiffness  BREASTS: No pain, masses, or nipple discharge  RESPIRATORY: No cough, wheezing, chills or hemoptysis; No shortness of breath  CARDIOVASCULAR: No chest pain, palpitations, dizziness, or leg swelling  GASTROINTESTINAL: No abdominal or epigastric pain. No nausea, vomiting, or hematemesis; No diarrhea or constipation. No melena or hematochezia.  GENITOURINARY: No dysuria, frequency, hematuria, or incontinence  NEUROLOGICAL: No headaches, memory loss, loss of strength, numbness, or tremors  SKIN: No itching, burning, rashes, or lesions   LYMPH NODES: No enlarged glands  ENDOCRINE: No heat or cold intolerance; No hair loss; No polydipsia or polyuria  MUSCULOSKELETAL: No joint pain or swelling; No muscle, back, or extremity pain  PSYCHIATRIC: No depression, anxiety, mood swings, or difficulty sleeping  HEME/LYMPH: No easy bruising, or bleeding gums  ALLERGY AND IMMUNOLOGIC: No hives or eczema  Allergies    adhesives (Rash)  contrast media (iodine-based) (Rash)  penicillin (Short breath; Other)    Intolerances      MEDICATIONS  (STANDING):  allopurinol 100 milliGRAM(s) Oral two times a day  ammonium lactate 12% Lotion 1 Application(s) Topical two times a day  aspirin enteric coated 81 milliGRAM(s) Oral daily  ATENolol  Tablet 50 milliGRAM(s) Oral at bedtime  buDESOnide 160 MICROgram(s)/formoterol 4.5 MICROgram(s) Inhaler 2 Puff(s) Inhalation two times a day  colchicine 0.6 milliGRAM(s) Oral two times a day  dextrose 5%. 1000 milliLiter(s) (50 mL/Hr) IV Continuous <Continuous>  dextrose 50% Injectable 12.5 Gram(s) IV Push once  dextrose 50% Injectable 25 Gram(s) IV Push once  dextrose 50% Injectable 25 Gram(s) IV Push once  doxycycline hyclate Capsule 100 milliGRAM(s) Oral every 12 hours  furosemide    Tablet 40 milliGRAM(s) Oral two times a day  insulin glargine Injectable (LANTUS) 60 Unit(s) SubCutaneous every morning  insulin lispro (HumaLOG) corrective regimen sliding scale   SubCutaneous three times a day before meals  insulin lispro (HumaLOG) corrective regimen sliding scale   SubCutaneous at bedtime  insulin lispro Injectable (HumaLOG) 5 Unit(s) SubCutaneous three times a day before meals  lactobacillus acidophilus 1 Tablet(s) Oral three times a day with meals  levothyroxine 75 MICROGram(s) Oral daily  spironolactone 25 milliGRAM(s) Oral two times a day    MEDICATIONS  (PRN):  acetaminophen   Tablet .. 650 milliGRAM(s) Oral every 6 hours PRN Temp greater or equal to 38.5C (101.3F), Mild Pain (1 - 3)  dextrose 40% Gel 15 Gram(s) Oral once PRN Blood Glucose LESS THAN 70 milliGRAM(s)/deciliter  glucagon  Injectable 1 milliGRAM(s) IntraMuscular once PRN Glucose LESS THAN 70 milligrams/deciliter  zolpidem 5 milliGRAM(s) Oral at bedtime PRN Insomnia  zolpidem 5 milliGRAM(s) Oral at bedtime PRN Insomnia    Vital Signs Last 24 Hrs  T(C): 36.6 (2019 14:11), Max: 36.7 (10 Jul 2019 21:55)  T(F): 97.8 (2019 14:11), Max: 98 (10 Jul 2019 21:55)  HR: 70 (2019 14:11) (60 - 76)  BP: 108/69 (2019 14:11) (99/62 - 119/74)  BP(mean): --  RR: 16 (2019 14:11) (15 - 18)  SpO2: 98% (2019 14:11) (96% - 98%)  PHYSICAL EXAM:  GENERAL: NAD, well-groomed, well-developed  HEAD:  Atraumatic, Normocephalic  EYES: EOMI, PERRLA, conjunctiva and sclera clear  ENMT: No tonsillar erythema, exudates, or enlargement; Moist mucous membranes, Good dentition, No lesions  NECK: Supple, No JVD, Normal thyroid  CHEST/LUNG: Clear to auscultation bilaterally; No rales, rhonchi, wheezing, or rubs  HEART: Regular rate and rhythm; No murmurs, rubs, or gallops  ABDOMEN: Soft, Nontender, Nondistended; Bowel sounds present  EXTREMITIES:  2+ Peripheral Pulses, No clubbing, cyanosis, or edema  MS: No joint swelling or deformity.   LYMPH: No lymphadenopathy noted  SKIN: No rashes or lesions  NERVOUS SYSTEM:  Motor Strength 4/5 B/L upper and lower extremities; DTRs 2+ intact and symmetric  PSYCH:  Alert & Oriented X3, Good concentration.                        14.6   8.42  )-----------( 279      ( 2019 07:51 )             44.9     2019 07:51    137    |  103    |  35     ----------------------------<  112    4.5     |  30     |  1.59     Ca    8.9        2019 07:51  Phos  2.8       10 Jul 2019 05:51  Mg     1.9       10 Jul 2019 05:51    TPro  7.5    /  Alb  3.0    /  TBili  0.7    /  DBili  x      /  AST  70     /  ALT  70     /  AlkPhos  177    2019 19:35    CAPILLARY BLOOD GLUCOSE      POCT Blood Glucose.: 215 mg/dL (2019 12:23)  POCT Blood Glucose.: 121 mg/dL (2019 08:27)  POCT Blood Glucose.: 214 mg/dL (2019 01:12)  POCT Blood Glucose.: 143 mg/dL (10 Jul 2019 21:35)  POCT Blood Glucose.: 219 mg/dL (10 Jul 2019 17:04)    PT/INR - ( 2019 07:51 )   PT: 29.0 sec;   INR: 2.58 ratio         PTT - ( 2019 19:35 )  PTT:34.0 sec  07-10 [id number] 9.3    07-10 Chol 141 LDL 50 HDL 62 Trig 143          Culture Results:   >=3 organisms. Probable collection contamination. (07-10 @ 12:06)  Culture Results:   No growth to date. ( @ 22:41)  Culture Results:   No growth to date. ( @ 22:41)    Procalcitonin, Serum: 0.15 ng/mL (07-10-19 @ 12:03)    Urinalysis Basic - ( 2019 21:37 )    Color: Yellow / Appearance: Slightly Turbid / S.015 / pH: x  Gluc: x / Ketone: Negative  / Bili: Negative / Urobili: Negative mg/dL   Blood: x / Protein: 15 mg/dL / Nitrite: Positive   Leuk Esterase: Moderate / RBC: 0-2 /HPF / WBC 26-50   Sq Epi: x / Non Sq Epi: Few / Bacteria: Moderate        RADIOLOGY TEST: (IMAGES REVIEWED BY ME)    Imaging Personally Reviewed:  [ ] YES        HEALTH ISSUES - PROBLEM Dx:  Prophylactic measure: Prophylactic measure  Essential hypertension: Essential hypertension  Atrial fibrillation, unspecified type: Atrial fibrillation, unspecified type  Cellulitis of left lower extremity: Cellulitis of left lower extremity  Chronic obstructive pulmonary disease, unspecified COPD type: Chronic obstructive pulmonary disease, unspecified COPD type  Stage 3 chronic kidney disease: Stage 3 chronic kidney disease  Hypothyroidism, unspecified type: Hypothyroidism, unspecified type  Type 2 diabetes mellitus with other specified complication, without long-term current use of insulin: Type 2 diabetes mellitus with other specified complication, without long-term current use of insulin Patient is a 79y old  Female who presents with a chief complaint of Leg swelling and redness. (2019 12:44)    HPI:  79 years old female with past medical history of COPD, DM, Atrial Fibrillation, hypothyroidism, gout, chronic leg edema, OA, s/p left knee replacement, who has been having increasing pain her left ankle. Patient  was thought to be having gout flair up. She was being treated with medications. Patient had increasing swelling and redness of left leg. She was seen by rheumatology and was sent in to ER for possible cellulitis. Patient was admitted for IV antibiotics as per ER physician.     Patient is feeling better today. As per patient, her swelling and redness are better. She still has some pain in her leg.   patient denies any chest pain or pressure.   No nausea or vomiting.   No fever or chills. (10 Jul 2019 12:45)    INTERVAL HPI/OVERNIGHT EVENTS:  Chart reviewed, notes reviewed.   Patient seen and examined.  Being followed by following specialists: ID, Nephrology    Consultant(s) Notes Reviewed:  [X] Yes    Care Discussed with Consultants/Other Providers: [X] Yes    19 --> Doing better. Denies any chest pain or pressure. No nausea or vomiting. Wants to go home. Patient was refusing Lantus and using her own Tresiba on her own.     REVIEW OF SYSTEMS:  CONSTITUTIONAL: + Fatigue   EYES: No eye pain, or discharge  ENMT:   No sinus or throat pain  NECK: No pain or stiffness  BREASTS: No pain, masses, or nipple discharge  RESPIRATORY: No cough, wheezing, chills or hemoptysis; No shortness of breath  CARDIOVASCULAR: No chest pain, palpitations, dizziness, or leg swelling  GASTROINTESTINAL: No abdominal or epigastric pain. No nausea, vomiting, or hematemesis; No diarrhea or constipation. No melena or hematochezia.  GENITOURINARY: No dysuria, frequency, hematuria, or incontinence  NEUROLOGICAL: No headaches, memory loss, loss of strength, numbness, or tremors  SKIN: Redness improving on left leg.   LYMPH NODES: No enlarged glands  ENDOCRINE: No heat or cold intolerance; No hair loss; No polydipsia or polyuria  MUSCULOSKELETAL: No joint pain or swelling; No muscle, back, or extremity pain  PSYCHIATRIC: No depression, anxiety, mood swings, or difficulty sleeping  HEME/LYMPH: No easy bruising, or bleeding gums  ALLERGY AND IMMUNOLOGIC: No hives or eczema    Allergies: adhesives (Rash)  contrast media (iodine-based) (Rash)  penicillin (Short breath; Other)    Intolerances    Home Medications:  acetaminophen 325 mg oral tablet: 2 tab(s) orally every 6 hours, As needed, Temp greater or equal to 38.5C (101.3F), Mild Pain (1 - 3) (2019 15:26)  Ambien 10 mg oral tablet: 1 tab(s) orally once a day (at bedtime) (2019 21:48)  aspirin 81 mg oral tablet: 1 tab(s) orally once a day (2019 21:48)  atenolol 50 mg oral tablet: orally once a day (at bedtime) (2019 21:48)  Dulera 5 mcg-100 mcg/inh inhalation aerosol: 2 puff(s) inhaled once a day (2019 21:48)  lactobacillus acidophilus oral capsule: 1 cap(s) orally 3 times a day for 2 weeks.  (2019 15:26)  Lasix 40 mg oral tablet: 1 tab(s) orally 2 times a day (2019 21:48)  levothyroxine 75 mcg (0.075 mg) oral capsule: 1 cap(s) orally once a day (2019 21:48)  NovoLOG 100 units/mL injectable solution: injectable 2 times a day (before meals) sliding scale coverage as needed (2019 21:48)  spironolactone 25 mg oral tablet: 1 tab(s) orally 2 times a day (2019 21:48)  Tresiba FlexTouch 100 units/mL subcutaneous solution: 76 unit(s) subcutaneous once a day (2019 21:48)  Trulicity Pen 0.75 mg/0.5 mL subcutaneous solution: subcutaneous once a week (2019 21:48)  warfarin 6 mg oral tablet: 1 tab(s) orally once a day (2019 21:48)    MEDICATIONS  (STANDING):  allopurinol 100 milliGRAM(s) Oral two times a day  ammonium lactate 12% Lotion 1 Application(s) Topical two times a day  aspirin enteric coated 81 milliGRAM(s) Oral daily  ATENolol  Tablet 50 milliGRAM(s) Oral at bedtime  buDESOnide 160 MICROgram(s)/formoterol 4.5 MICROgram(s) Inhaler 2 Puff(s) Inhalation two times a day  colchicine 0.6 milliGRAM(s) Oral two times a day  dextrose 5%. 1000 milliLiter(s) (50 mL/Hr) IV Continuous <Continuous>  dextrose 50% Injectable 12.5 Gram(s) IV Push once  dextrose 50% Injectable 25 Gram(s) IV Push once  dextrose 50% Injectable 25 Gram(s) IV Push once  doxycycline hyclate Capsule 100 milliGRAM(s) Oral every 12 hours  furosemide    Tablet 40 milliGRAM(s) Oral two times a day  insulin glargine Injectable (LANTUS) 60 Unit(s) SubCutaneous every morning  insulin lispro (HumaLOG) corrective regimen sliding scale   SubCutaneous three times a day before meals  insulin lispro (HumaLOG) corrective regimen sliding scale   SubCutaneous at bedtime  insulin lispro Injectable (HumaLOG) 5 Unit(s) SubCutaneous three times a day before meals  lactobacillus acidophilus 1 Tablet(s) Oral three times a day with meals  levothyroxine 75 MICROGram(s) Oral daily  spironolactone 25 milliGRAM(s) Oral two times a day    MEDICATIONS  (PRN):  acetaminophen   Tablet .. 650 milliGRAM(s) Oral every 6 hours PRN Temp greater or equal to 38.5C (101.3F), Mild Pain (1 - 3)  dextrose 40% Gel 15 Gram(s) Oral once PRN Blood Glucose LESS THAN 70 milliGRAM(s)/deciliter  glucagon  Injectable 1 milliGRAM(s) IntraMuscular once PRN Glucose LESS THAN 70 milligrams/deciliter  zolpidem 5 milliGRAM(s) Oral at bedtime PRN Insomnia  zolpidem 5 milliGRAM(s) Oral at bedtime PRN Insomnia    Vital Signs Last 24 Hrs  T(C): 36.6 (2019 14:11), Max: 36.7 (10 Jul 2019 21:55)  T(F): 97.8 (2019 14:11), Max: 98 (10 Jul 2019 21:55)  HR: 70 (2019 14:11) (60 - 76)  BP: 108/69 (2019 14:11) (99/62 - 119/74)  BP(mean): --  RR: 16 (2019 14:11) (15 - 18)  SpO2: 98% (2019 14:11) (96% - 98%)    PHYSICAL EXAM:  GENERAL: NAD, well-groomed, well-developed  HEAD:  Atraumatic, Normocephalic  EYES: EOMI, PERRLA, conjunctiva and sclera clear  ENMT: No tonsillar erythema, exudates, or enlargement; Moist mucous membranes, Good dentition, No lesions  NECK: Supple, No JVD, Normal thyroid  CHEST/LUNG: Clear to auscultation bilaterally; No rales, rhonchi, wheezing, or rubs  HEART: Regular rate and rhythm; No murmurs, rubs, or gallops  ABDOMEN: Soft, Nontender, Nondistended; Bowel sounds present  EXTREMITIES:  2+ Peripheral Pulses, No clubbing, cyanosis, or edema  MS: No joint swelling or deformity.   LYMPH: No lymphadenopathy noted  SKIN: No rashes or lesions  NERVOUS SYSTEM:  Motor Strength 4/5 B/L upper and lower extremities; DTRs 2+ intact and symmetric  PSYCH:  Alert & Oriented X3, Good concentration.    LABS:                       14.6   8.42  )-----------( 279      ( 2019 07:51 )             44.9     2019 07:51    137    |  103    |  35     ----------------------------<  112    4.5     |  30     |  1.59     Ca    8.9        2019 07:51  Phos  2.8       10 Jul 2019 05:51  Mg     1.9       10 Jul 2019 05:51    TPro  7.5    /  Alb  3.0    /  TBili  0.7    /  DBili  x      /  AST  70     /  ALT  70     /  AlkPhos  177    2019 19:35    CAPILLARY BLOOD GLUCOSE  POCT Blood Glucose.: 215 mg/dL (2019 12:23)  POCT Blood Glucose.: 121 mg/dL (2019 08:27)  POCT Blood Glucose.: 214 mg/dL (2019 01:12)  POCT Blood Glucose.: 143 mg/dL (10 Jul 2019 21:35)  POCT Blood Glucose.: 219 mg/dL (10 Jul 2019 17:04)    PT/INR - ( 2019 07:51 )   PT: 29.0 sec;   INR: 2.58 ratio       PTT - ( 2019 19:35 )  PTT:34.0 sec    07-10 [id number] 9.3    07-10 Chol 141 LDL 50 HDL 62 Trig 143    Culture Results:   >=3 organisms. Probable collection contamination. (07-10 @ 12:06)  Culture Results:   No growth to date. ( @ 22:41)  Culture Results:   No growth to date. ( @ 22:41)    Procalcitonin, Serum: 0.15 ng/mL (07-10-19 @ 12:03)    Urinalysis Basic - ( 2019 21:37 )    Color: Yellow / Appearance: Slightly Turbid / S.015 / pH: x  Gluc: x / Ketone: Negative  / Bili: Negative / Urobili: Negative mg/dL   Blood: x / Protein: 15 mg/dL / Nitrite: Positive   Leuk Esterase: Moderate / RBC: 0-2 /HPF / WBC 26-50   Sq Epi: x / Non Sq Epi: Few / Bacteria: Moderate    RADIOLOGY TEST: (IMAGES REVIEWED BY ME)  < from: US Renal (07.10.19 @ 17:54) >  EXAM:  US KIDNEY(S)                        PROCEDURE DATE:  07/10/2019    INTERPRETATION:  CLINICAL INFORMATION: Chronic kidney disease    COMPARISON: 2016. (Included in an  abdominal sonogram.)    TECHNIQUE: Sonography of the kidneys and bladder. Technical note   indicates Limited study.    FINDINGS:    Right kidney:  10.6 cm. No renal mass, hydronephrosis or calculi. No   perirenal collections.    Left kidney: 12.4 cm. No renal mass, hydronephrosis or calculi. No   perirenal collections    Urinary bladder: Empty.    IMPRESSION:     No hydronephrotic changes.    < end of copied text >    Imaging Personally Reviewed:  [X] YES        HEALTH ISSUES - PROBLEM Dx:  Prophylactic measure: Prophylactic measure  Essential hypertension: Essential hypertension  Atrial fibrillation, unspecified type: Atrial fibrillation, unspecified type  Cellulitis of left lower extremity: Cellulitis of left lower extremity  Chronic obstructive pulmonary disease, unspecified COPD type: Chronic obstructive pulmonary disease, unspecified COPD type  Stage 3 chronic kidney disease: Stage 3 chronic kidney disease  Hypothyroidism, unspecified type: Hypothyroidism, unspecified type  Type 2 diabetes mellitus with other specified complication, without long-term current use of insulin: Type 2 diabetes mellitus with other specified complication, without long-term current use of insulin

## 2019-07-11 NOTE — DISCHARGE NOTE PROVIDER - NSDCCONDITION_GEN_ALL_CORE
[place] [facility] [place] (via cart/transport) with all belongings. Pt oriented to room and call light is with reach.    Stable

## 2019-07-12 LAB
MRSA PCR RESULT.: DETECTED
S AUREUS DNA NOSE QL NAA+PROBE: DETECTED

## 2021-10-29 NOTE — ED PROVIDER NOTE - NS ED MD DISPO DIVISION
"10/29/2021    Danny Paige MD, MD  [address]  [place] [address]    RE: Efrem Ramos       Dear Colleague,    I had the pleasure of seeing Efrem Ramos in the [facility].    [facility]    I had the pleasure of seeing Alfredo when he and Alfredo Mojica came for follow up of aortic stenosis.  This 77 year old sees Dr. Xiong and Dr. Cortes for his history of:    1. Severe low flow, low gradient AoS - echo 6/3/2021 with SEBASTIAN 0.5cm2 with mean gradient 19mmHg  2. CAD - noted during TAVR w/u 5/2020. S/p rotational atherectomy and ISABEL x 4 to prox-distal RCA. Complicated by hemorrhagic shock from LFA bleeding. 4 units of blood transfused.  3. Permanent AFib - rate control with digoxin and lopressor. Eliquis for CHADSVASc 6 (HTN, CVA, CAD, age)  4. Mild-moderate MS, severe TR - on echo 6/3/2021  5. H/o L PCA CVA - 2013  6. MEL - on CPAP  7. HFpEF  8. PVD - has R SFA not amenable to PCI  9. Dyslipidemia   10. MGUS - sees Dr. Hurley. CBC done 10/2021 wnl    I saw Alfredo 9/30 at which time we reviewed the recent death of his wife from pneumonia. He continued his 10-15 minutes of pedaling the foot bike but lacked the gumption to do much else. Alfredo Mojica was working on improving his dad's nutrition, as Dr. Cortes did not think he'd be a good candidate for TAVR until his frailty improved. They requested close follow-up with me.     Interval History:  Notes increased \"windedness\" over the last month and today I see this on exam as well today. No issues with CP. Remains without orthopnea, PND.     He's switched the pedal bike to sitting step machine, still only going ~10 minutes total for the day. He notes this step machine is giving him a better workout than the pedal bike.    Notes increased dizziness with standing/orthostasis since I saw him last month. Slipped recently when doing the stairs (Alfredo Mojica uses a gait belt for his dad) - confirms no LOC " "or injury. No palpitations, dizziness associated.      VITALS:  Vitals: /75 (BP Location: Left arm, Patient Position: Sitting, Cuff Size: Adult Regular)   Pulse 84   Ht 1.727 m (5' 8\")   Wt 62.6 kg (138 lb 1.6 oz)   SpO2 99%   BMI 21.00 kg/m      Diagnostic Testing:  Echocardiogram 6/3/2021 - EF 65-70%. Sev dilated RV, mod decreased RVSF. Severe MAC, 2+MR, mild-mod MS (3.7 mmHg @ 84 bpm). 3-4+TR. Sev AoS. Mean gradient 19mmHg with SEBASTIAN 0.59cm2  Angiogram 5/2020 - Patent RCA stents. No obstructive dz elsewhere. Mild pHTN. Mod-sev elevated RH filling pressures (mean RA 14 mmHg). Nl CO. SEBASTIAN 1.1 cm2, mean gradient 23 mmHg  Component      Latest Ref Rng & Units 2/16/2021 10/26/2021   WBC      4.0 - 11.0 10e3/uL 10.4 10.6   RBC Count      4.40 - 5.90 10e6/uL 3.83 (L) 3.58 (L)   Hemoglobin      13.3 - 17.7 g/dL 11.8 (L) 11.8 (L)   Hematocrit      40.0 - 53.0 % 37.5 (L) 35.7 (L)   MCV      78 - 100 fL 98 100   MCH      26.5 - 33.0 pg 30.8 33.0   MCHC      31.5 - 36.5 g/dL 31.5 33.1   RDW      10.0 - 15.0 % 15.5 (H) 14.6   Platelet Count      150 - 450 10e3/uL 344 329     Component      Latest Ref Rng & Units 7/27/2021 9/30/2021 10/26/2021   Sodium      133 - 144 mmol/L 139 138 138   Potassium      3.4 - 5.3 mmol/L 3.7 3.5 3.7   Chloride      94 - 109 mmol/L 106 106 109   Carbon Dioxide      20 - 32 mmol/L 29 29 23   Anion Gap      3 - 14 mmol/L 4 3 6   Urea Nitrogen      7 - 30 mg/dL 22 23 20   Creatinine      0.66 - 1.25 mg/dL 0.97 1.06 0.88   Calcium      8.5 - 10.1 mg/dL 8.7 8.5 8.4 (L)   Glucose      70 - 99 mg/dL 90 90 73     Component      Latest Ref Rng & Units 10/26/2021   Alkaline Phosphatase      40 - 150 U/L 118   AST      0 - 45 U/L 21   ALT      0 - 70 U/L 25   Protein Total      6.8 - 8.8 g/dL 7.7   Albumin      3.4 - 5.0 g/dL 2.8 (L)   Bilirubin Total      0.2 - 1.3 mg/dL 0.7   GFR Estimate      >60 mL/min/1.73m2 82       Plan:  1. Increase protein intake and do the urine protein tests Dr. Paige " "rec'd  2. Increase Lasix from 20 mg in AM to 20 mg in AM and 10 mg at noon  3. Will review with Dr. Cortes to determine follow-up for TAVR    Assessment/Plan:    1. Severe Aortic Stenosis, low flow    This has been followed for the last few years. He had a prolonged recovery from angiogram that took >1 year 5/2020, resulting in 4 stents placed to RCA but complicated by LFA bleed/hemorrhagic shock/prolonged hospitalization    Dr. Cortes met with him 6/2021, noting he was a poor candidate for TAVR given his extreme frailty. He's been working on building up stamina/calories per Dr. Cortes's recommendations, but I note he has \"backslid\" since his wife's death but is still working on weight gain/improved nutrition and exercise        I had previously talked to them about Palliative Care and Alfredo and his son were not interested at that time in meeting with anyone    PLAN:    Will review with Dr. Cortes to see if any additional testing needed or when follow-up for possible TAVR work up could proceed    2. HFpEF    Remains on Lasix 20 mg daily. BMP on this dose was raina    Breathing is worse today on exam; Alfredo notes \"windedness\"    PLAN:    Increase Lasix from 20 daily to 20 in AM and 10 at noon    Follow-up determined based on TAVR      3. CAD    As above, prolonged hospitalization following 4 stents placed to RCA, with LFA bleed/hemorrhagic shock/prolonged hospitalization    CP had improved following stent implantation    Remains on BB, statin, Plavix.    PLAN:    Hgb stable      4. Permanent AFib    Remains on proper dose of Eliquis given age/weight/Cr criteria. CHADSVASc 6 (HTN, CVA, CAD, age)    Rate has been under good control on low dose metoprolol XL 25 mg daily and digoxin 125 mcg daily     PLAN:    Continue current meds        Odette Raymond PA-C, MSPAS      No orders of the defined types were placed in this encounter.    Orders Placed This Encounter   Medications     furosemide (LASIX) 20 MG tablet     Sig: Take 1 " tablet (20 mg) by mouth daily AND 0.5 tablets (10 mg) daily (with lunch).     Dispense:  135 tablet     Refill:  3     Medications Discontinued During This Encounter   Medication Reason     furosemide (LASIX) 20 MG tablet          Encounter Diagnoses   Name Primary?     Chronic diastolic congestive heart failure (H)      Aortic valve stenosis, etiology of cardiac valve disease unspecified      Coronary artery disease involving native coronary artery of native heart without angina pectoris        CURRENT MEDICATIONS:  Current Outpatient Medications   Medication Sig Dispense Refill     acetaminophen (TYLENOL) 325 MG tablet Take 650 mg by mouth 3 times daily Plus 650 mg bid prn       apixaban ANTICOAGULANT (ELIQUIS) 5 MG tablet Take 1 tablet (5 mg) by mouth 2 times daily 180 tablet 3     atorvastatin (LIPITOR) 40 MG tablet Take 1 tablet (40 mg) by mouth daily 90 tablet 3     budesonide (ENTOCORT EC) 3 MG EC capsule After 6 weeks of 9 mg per day, Take 6 mg per day x 2 weeks, then 3 mg per day x 2 weeks 42 capsule 0     calcium carbonate 600 mg-vitamin D 400 units (CALTRATE) 600-400 MG-UNIT per tablet Take 1 tablet by mouth 2 times daily        clopidogrel (PLAVIX) 75 MG tablet Take 1 tablet (75 mg) by mouth daily 90 tablet 3     digoxin (LANOXIN) 125 MCG tablet Take 1 tablet (125 mcg) by mouth daily 90 tablet 3     furosemide (LASIX) 20 MG tablet Take 1 tablet (20 mg) by mouth daily AND 0.5 tablets (10 mg) daily (with lunch). 135 tablet 3     gabapentin (NEURONTIN) 300 MG capsule Take 2 capsules (600 mg) by mouth At Bedtime 180 capsule 3     melatonin 1 MG TABS tablet Take 1 mg by mouth nightly as needed for sleep       metoprolol succinate ER (TOPROL XL) 25 MG 24 hr tablet Take 1 tablet (25 mg) by mouth daily 90 tablet 3     mirtazapine (REMERON) 15 MG tablet Take 1 tablet (15 mg) by mouth At Bedtime 90 tablet 3     multivitamin (OCUVITE) TABS Take 1 tablet by mouth 2 times daily        potassium chloride ER  "(K-TAB/KLOR-CON) 10 MEQ CR tablet Take 1 tablet (10 mEq) by mouth daily 90 tablet 3     budesonide (UCERIS) 9 MG 24 hr tablet Take 1 tablet (9 mg) by mouth daily (Patient not taking: Reported on 10/29/2021) 42 tablet 0       ALLERGIES     Allergies   Allergen Reactions     Sulfa Drugs Difficulty breathing and Swelling     Adhesive Tape Blisters     Amiodarone Other (See Comments)     Developed pleural effusion     Penicillins Other (See Comments)     Reaction occurred as a child         Review of Systems:  Skin:  Negative rash   Eyes:  Positive for glasses  ENT:  Negative postnasal drainage;hearing loss  Respiratory:  Positive for dyspnea on exertion;sleep apnea;CPAP  Cardiovascular:  Negative for;palpitations;chest pain;edema Positive for;fatigue;lightheadedness;dizziness  Gastroenterology: Positive for poor appetite  Genitourinary:  Negative urgency  Musculoskeletal:  Positive for joint pain;arthritis;muscular weakness;back pain  Neurologic:  Positive for stroke;incoordination;memory problems;headaches  Psychiatric:  Positive for sleep disturbances;excessive stress;anxiety;depression  Heme/Lymph/Imm:  Positive for allergies  Endocrine:  Negative      Physical Exam:  Vitals: /75 (BP Location: Left arm, Patient Position: Sitting, Cuff Size: Adult Regular)   Pulse 84   Ht 1.727 m (5' 8\")   Wt 62.6 kg (138 lb 1.6 oz)   SpO2 99%   BMI 21.00 kg/m      Constitutional:  cooperative, alert and oriented, well developed, well nourished, in no acute distress frail      Skin:  warm and dry to the touch, no apparent skin lesions or masses noted        Head:  normocephalic, no masses or lesions        Eyes:  pupils equal and round;conjunctivae and lids unremarkable;sclera white        ENT:  not assessed this visit        Neck:  JVP normal transmitted murmur      Chest:      LLL>RLL crackles    Cardiac: no S3 or S4 irregular rhythm     systolic murmur;late systolic murmur;grade 3 systolic murmur;RUSB   S1, S2 regular " with 3/6 ESM LUSB    Abdomen:  BS normoactive;non-tender        Vascular:   pulses below the femoral arteries are diminished                                    Extremities and Back:  no deformities, clubbing, cyanosis, erythema observed;no edema   up to shin    Neurological:  no gross motor deficits;affect appropriate ataxia;limb weakness          PAST MEDICAL HISTORY:  Past Medical History:   Diagnosis Date     Atrial fibrillation (H)     amiodarone therapy discontinued due to pulmonary toxicity     Atrial flutter (H)      Benign essential hypertension 11/20/2018     Cancer (H) vocal cord     Carpal tunnel syndrome     abstracted 7/3/02.     Coronary artery disease involving native coronary artery of native heart without angina pectoris 5/8/2020    Cath 5/28/2020: patent stents; Cath 5/18/2020: ISABEL x4 to RCA; Cath 3/2020: 1 vessel disease; Cath 2017: moderate 2 vessel disease     CVA (cerebral infarction) 5/5/2015     Demyelinating disease of central nervous system, unspecified (H)     abstracted 7/3/02.     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     Pneumonia 8/17/2016     PVD (peripheral vascular disease) (H)      Redundant colon     needs CT colonography     Shingles      SKIN DISORDERS NEC 3/15/2005     Sleep apnea      Sleep apnea      SVT (supraventricular tachycardia) (H)        PAST SURGICAL  HISTORY:  Past Surgical History:   Procedure Laterality Date     BIOPSY  brain 2002     BONE MARROW BIOPSY, BONE SPECIMEN, NEEDLE/TROCAR N/A 6/8/2017    Procedure: BIOPSY BONE MARROW;  UNILATERAL BONE MARROW BIOPSY (CONSCIOUS SEDATION) ;  Surgeon: Jamie Gonzales MD;  Location:  GI     CARDIAC CATHERIZATION  09/05/2017    2V CAD, IFR of RCA 0.95     CV CORONARY ANGIOGRAM N/A 3/23/2020    Procedure: Coronary Angiogram and right heart cath;  Surgeon: Gino Ferrer MD;  Location:  HEART CARDIAC CATH LAB     CV HEART CATHETERIZATION WITH POSSIBLE INTERVENTION N/A 5/28/2020    Procedure: Heart Catheterization with Possible Intervention;  Surgeon: Larry Chawla MD;  Location:  HEART CARDIAC CATH LAB     CV HEART CATHETERIZATION WITH POSSIBLE INTERVENTION N/A 5/18/2020    Procedure: Heart Catheterization with Possible Intervention;  Surgeon: Gaurang Swenson MD;  Location:  HEART CARDIAC CATH LAB     CV INSTANTANEOUS WAVE-FREE RATIO N/A 3/23/2020    Procedure: Instantaneous Wave-Free Ratio;  Surgeon: Gino Ferrer MD;  Location:  HEART CARDIAC CATH LAB     CV PCI ATHERECTOMY ORBITAL N/A 5/18/2020    Procedure: Percutaneous Coronary Intervention Atherectomy Rotational;  Surgeon: Gaurang Swenson MD;  Location:  HEART CARDIAC CATH LAB     CV PCI STENT DRUG ELUTING N/A 5/18/2020    Procedure: Percutaneous Coronary Intervention Stent Drug Eluting;  Surgeon: Gaurang Swenson MD;  Location:  HEART CARDIAC CATH LAB     CV RIGHT HEART CATH MEASUREMENTS RECORDED N/A 5/28/2020    Procedure: Right Heart Cath;  Surgeon: Larry Chawla MD;  Location:  HEART CARDIAC CATH LAB     CV TEMPORARY PACEMAKER INSERTION N/A 5/18/2020    Procedure: Temporary Pacemaker Insertion;  Surgeon: Gaurang Swenson MD;  Location:  HEART CARDIAC CATH LAB     ESOPHAGOSCOPY, GASTROSCOPY, DUODENOSCOPY (EGD), COMBINED N/A 9/8/2018    Procedure: COMBINED ESOPHAGOSCOPY, GASTROSCOPY,  DUODENOSCOPY (EGD), BIOPSY SINGLE OR MULTIPLE;;  Surgeon: Xander Marie MD;  Location:  GI     HEAD & NECK SURGERY       ORTHOPEDIC SURGERY      right arm ulna reset after injury     THORACOSCOPIC WEDGE RESECTION LUNG Right 2016    Procedure: THORACOSCOPIC WEDGE RESECTION LUNG;  Surgeon: Abdelrahman Noriega MD;  Location:  OR     [facility] NONSPECIFIC PROCEDURE  as a child    T & A. abstracted 7/3/02.     ZZC NONSPECIFIC PROCEDURE  early    CTR. abstracted 7/3/02.       FAMILY HISTORY:  Family History   Problem Relation Age of Onset     Blood Disease Mother         Anemia     Cardiovascular Father      Cancer - colorectal Maternal Grandfather      Hypertension Brother      Diabetes Sister 5        Juvinile Diabetes passed at 36     Respiratory Other         Lung Cancer       SOCIAL HISTORY:  Social History     Socioeconomic History     Marital status:      Spouse name: None     Number of children: None     Years of education: None     Highest education level: None   Occupational History     None   Tobacco Use     Smoking status: Former Smoker     Packs/day: 1.00     Years: 30.00     Pack years: 30.00     Types: Cigarettes     Quit date: 2013     Years since quittin.2     Smokeless tobacco: Never Used   Substance and Sexual Activity     Alcohol use: Not Currently     Alcohol/week: 42.0 standard drinks     Types: 42 Standard drinks or equivalent per week     Drug use: No     Sexual activity: Not Currently   Other Topics Concern     Parent/sibling w/ CABG, MI or angioplasty before 65F 55M? Not Asked      Service Not Asked     Blood Transfusions Not Asked     Caffeine Concern Yes     Comment: 1 Coke occasionally      Occupational Exposure Not Asked     Hobby Hazards Not Asked     Sleep Concern Not Asked     Stress Concern Not Asked     Weight Concern Not Asked     Special Diet No     Back Care Not Asked     Exercise No     Bike Helmet Not Asked     Seat Belt Not Asked      Self-Exams Not Asked   Social History Narrative    Retired (disability)      Social Determinants of Health     Financial Resource Strain:      Difficulty of Paying Living Expenses:    Food Insecurity:      Worried About Running Out of Food in the Last Year:      Ran Out of Food in the Last Year:    Transportation Needs:      Lack of Transportation (Medical):      Lack of Transportation (Non-Medical):    Physical Activity:      Days of Exercise per Week:      Minutes of Exercise per Session:    Stress:      Feeling of Stress :    Social Connections:      Frequency of Communication with Friends and Family:      Frequency of Social Gatherings with Friends and Family:      Attends Alevism Services:      Active Member of Clubs or Organizations:      Attends Club or Organization Meetings:      Marital Status:    Intimate Partner Violence:      Fear of Current or Ex-Partner:      Emotionally Abused:      Physically Abused:      Sexually Abused:            Thank you for allowing me to participate in the care of your patient.      Sincerely,     Cherise Raymond PA-C     [facility]  cc:   Cherise Raymond PA-C  [address] [place] [id number]         [facility]

## 2021-12-28 NOTE — ED PROVIDER NOTE - NORMAL, MLM
Progress Note  PULMONARY    Admit Date: 12/26/2021 12/28/2021      SUBJECTIVE:     12/28- continues on bipap, tele with atrial fib. Hgb dropping to 7. Diuresed well with -2L yesterday. Feels better      OBJECTIVE:     Vitals (Most recent):  Vitals:    12/28/21 0924   BP:    Pulse: 80   Resp:    Temp:        Vitals (24 hour range):  Temp:  [97.16 °F (36.2 °C)-99.86 °F (37.7 °C)]   Pulse:  []   Resp:  [12-55]   BP: ()/(46-70)   SpO2:  [80 %-100 %]       Intake/Output Summary (Last 24 hours) at 12/28/2021 0945  Last data filed at 12/28/2021 0934  Gross per 24 hour   Intake 670.84 ml   Output 3045 ml   Net -2374.16 ml          Physical Exam:  The patient's neuro status (alertness,orientation,cognitive function,motor skills,), pharyngeal exam (oral lesions, hygiene, abn dentition,), Neck (jvd,mass,thyroid,nodes in neck and above/below clavicle),RESPIRATORY(symmetry,effort,fremitus,percussion,auscultation),  Cor(rhythm,heart tones including gallops,perfusion,edema)ABD(distention,hepatic&splenomegaly,tenderness,masses), Skin(rash,cyanosis),Psyc(affect,judgement,).  Exam negative except for these pertinent findings:    Awake, alert, on bipap, no distress  Bilateral diminished breath sounds   HR irregularly irreg  Abdomen soft, nontender  No edema        Radiographs reviewed: view by direct vision   CXR 12/26- bilat effusions, bilat basilar airspace disease with pulmonary vascular congestion, new compared to prior film 11/2021.  CT chest 8/3/21- emphysematous changes, hyperinflation, mild ggos in the bases     Labs     Recent Labs   Lab 12/28/21  0352   WBC 13.03*   HGB 7.0*   HCT 23.8*        Recent Labs   Lab 12/28/21  0352      K 3.8   CL 97   CO2 31*   BUN 22   CREATININE 1.4   *   CALCIUM 8.7   MG 2.2   PHOS 4.2   AST 12   ALT 10   ALKPHOS 58   BILITOT 0.4   PROT 5.6*   ALBUMIN 2.5*   No results for input(s): PH, PCO2, PO2, HCO3 in the last 24 hours.  Microbiology Results (last 7 days)      Procedure Component Value Units Date/Time    Blood culture x two cultures. Draw prior to antibiotics. [[id number]] Collected: 12/26/21 1902    Order Status: Completed Specimen: Blood from Peripheral, Right Updated: 12/28/21 0613     Blood Culture, Routine No Growth to date      No Growth to date    Narrative:      Aerobic and anaerobic    Blood culture x two cultures. Draw prior to antibiotics. [[id number]] Collected: 12/26/21 1910    Order Status: Completed Specimen: Blood Updated: 12/28/21 0613     Blood Culture, Routine No Growth to date      No Growth to date    Narrative:      Aerobic and anaerobic          Impression:  Active Hospital Problems    Diagnosis  POA    *Bilateral pneumonia [J18.9]  Yes    Acute respiratory failure [J96.00]  Yes    Rectal bleeding [K62.5]  No    Chronic anemia [D64.9]  Yes    Chronic hypotension on midodrine [I95.9]  Yes     Chronic    Congestive heart failure [I50.9]  Yes    Paroxysmal atrial fibrillation [I48.0]  Yes    Acute on chronic respiratory failure with hypoxia and hypercapnia [J96.21, J96.22]  Yes    COPD (chronic obstructive pulmonary disease) [J44.9]  Yes     Chronic      Resolved Hospital Problems   No resolved problems to display.               Plan:     - continue bipap- breaks as tolerated with nc oxygen  - continue diuresis  - continue breo inhaler  - continue nebulized bronchodilators  - continue zosyn for now although I doubt pneumonia  - CXR repeat in am  - cardiology following  - GI following with plan to scope  - monitor Hgb; transfuse as needed to maintain >7  - consider holding eliquis given dropping Hgb- will message hospitalist    The following were evaluated and adjusted as needed: mechanical ventilator settings and weaning status, antibiotics, acid base balance and oxygenation needs and input and output and renal status       Critical Care  - THE PATIENT HAS A HIGH PROBABILITY OF IMMINENT OR LIFE THREATENING DETERIORATION.  Over 50%time of  encounter was in direct care at bedside.  Time was 30 to 74 minutes required for patient care.  Time needed for all of the above totaled 31 minutes.    Miranda Cash MD  Pulmonary & Critical Care Medicine               jagjit all pertinent systems normal

## 2022-04-08 NOTE — DISCHARGE NOTE ADULT - DISCHARGE DATE
Courtesy call made.  Spoke with Patient(Self) and he acknowledged that he was doing better.   06-Jan-2017

## 2022-07-22 ENCOUNTER — NON-APPOINTMENT (OUTPATIENT)
Age: 82
End: 2022-07-22

## 2022-07-22 ENCOUNTER — APPOINTMENT (OUTPATIENT)
Dept: OPHTHALMOLOGY | Facility: CLINIC | Age: 82
End: 2022-07-22

## 2022-07-22 PROCEDURE — 92004 COMPRE OPH EXAM NEW PT 1/>: CPT

## 2022-07-22 PROCEDURE — 92134 CPTRZ OPH DX IMG PST SGM RTA: CPT

## 2023-01-18 NOTE — ED PROVIDER NOTE - NS ED MD TWO NIGHTS YN
Yes Low Dose Naltrexone Pregnancy And Lactation Text: Naltrexone is pregnancy category C.  There have been no adequate and well-controlled studies in pregnant women.  It should be used in pregnancy only if the potential benefit justifies the potential risk to the fetus.   Limited data indicates that naltrexone is minimally excreted into breastmilk.

## 2023-05-25 ENCOUNTER — NON-APPOINTMENT (OUTPATIENT)
Age: 83
End: 2023-05-25

## 2023-05-25 DIAGNOSIS — E66.01 MORBID (SEVERE) OBESITY DUE TO EXCESS CALORIES: ICD-10-CM

## 2023-05-25 RX ORDER — ATENOLOL 50 MG/1
50 TABLET ORAL
Refills: 0 | Status: ACTIVE | COMMUNITY

## 2023-05-25 RX ORDER — SPIRONOLACTONE 25 MG/1
25 TABLET ORAL
Refills: 0 | Status: ACTIVE | COMMUNITY

## 2023-05-25 RX ORDER — ASPIRIN ENTERIC COATED TABLETS 81 MG 81 MG/1
81 TABLET, DELAYED RELEASE ORAL
Refills: 0 | Status: ACTIVE | COMMUNITY

## 2023-05-25 RX ORDER — FUROSEMIDE 80 MG/1
80 TABLET ORAL
Refills: 0 | Status: ACTIVE | COMMUNITY

## 2023-05-30 NOTE — PHYSICAL THERAPY INITIAL EVALUATION ADULT - IMPAIRMENTS CONTRIBUTING IMPAIRED BED MOBILITY, REHAB EVAL
Counseled patient about diagnostic testing and treatment plan. All questions answered. Abnormal lab/radiographical/microbiological data reviewed. decreased strength/impaired balance

## 2023-06-23 NOTE — PATIENT PROFILE ADULT - VISION (WITH CORRECTIVE LENSES IF THE PATIENT USUALLY WEARS THEM):
Normal vision: sees adequately in most situations; can see medication labels, newsprint I have personally seen and examined the patient. I have collaborated with and supervised the

## 2023-07-21 NOTE — ED ADULT NURSE NOTE - CINV DISCH TEACH PARTICIP
General Sunscreen Counseling: I recommended regular use of a broad spectrum sunscreen with a Sun Protection Factor (SPF) of 30 or higher. Sun protective clothing can be used in lieu of sunscreen, but must be worn the entire time that the patient is exposed to the sun. Detail Level: Generalized Family/Patient

## 2024-10-31 NOTE — DIETITIAN INITIAL EVALUATION ADULT. - PROBLEM/PLAN-5
Date of Procedure: Wednesday 3/19/25 @ [place] Office with Dr. Cyr     Procedure time: 10:00 am     Spoke to Trevin's wife Kathya and she is agreeable to date and time. She asked that I mail all Trevin's Varithena procedure information/instructions, I told her I would get them out in the mail next Wednesday, she verbalized understanding. Encouraged to call with any questions or concerns.       
Heide,  Please schedule patient for varithena of the right lower extremity with Dr. Ger Leone   
Left message for Trevin to return my call   
Left message for Trevin to return my call   
Noted, thank you  
DISPLAY PLAN FREE TEXT